# Patient Record
Sex: FEMALE | Race: WHITE | NOT HISPANIC OR LATINO | Employment: OTHER | URBAN - METROPOLITAN AREA
[De-identification: names, ages, dates, MRNs, and addresses within clinical notes are randomized per-mention and may not be internally consistent; named-entity substitution may affect disease eponyms.]

---

## 2017-11-15 LAB — RAPID GROUP A STREP (OHS): NEGATIVE

## 2018-01-29 LAB
INFLUENZA A ANTIGEN, POC: POSITIVE
INFLUENZA B ANTIGEN, POC: NEGATIVE
RAPID GROUP A STREP (OHS): NEGATIVE

## 2018-02-08 ENCOUNTER — HISTORICAL (OUTPATIENT)
Dept: CARDIOLOGY | Facility: HOSPITAL | Age: 58
End: 2018-02-08

## 2018-06-16 LAB — RAPID GROUP A STREP (OHS): NEGATIVE

## 2019-03-12 LAB
CALCIUM SERPL-MCNC: 8.9 MG/DL (ref 8.5–10.1)
CHLORIDE SERPL-SCNC: 104 MMOL/L (ref 98–107)
CHOLEST SERPL-MCNC: 189 MG/DL
CO2 SERPL-SCNC: 27 MMOL/L (ref 21–32)
GLUCOSE SERPL-MCNC: 84 MG/DL (ref 74–106)
HDLC SERPL-MCNC: 49 MG/DL (ref 35–60)
LDLC SERPL CALC-MCNC: 121 MG/DL
POTASSIUM SERPL-SCNC: 3.9 MMOL/L (ref 3.5–5.1)
SODIUM SERPL-SCNC: 139 MEQ/L (ref 131–145)
TRIGL SERPL-MCNC: 86 MG/DL (ref 30–150)

## 2019-03-14 ENCOUNTER — HISTORICAL (OUTPATIENT)
Dept: ADMINISTRATIVE | Facility: HOSPITAL | Age: 59
End: 2019-03-14

## 2019-03-14 LAB
ABS NEUT (OLG): 1.6 X10(3)/MCL (ref 2.1–9.2)
DEPRECATED CALCIDIOL+CALCIFEROL SERPL-MC: 22.78 NG/ML (ref 30–80)
EOSINOPHIL NFR BLD MANUAL: 32 % (ref 0–8)
ERYTHROCYTE [DISTWIDTH] IN BLOOD BY AUTOMATED COUNT: 13.8 % (ref 11.5–17)
HCT VFR BLD AUTO: 41.8 % (ref 37–47)
HGB BLD-MCNC: 13.6 GM/DL (ref 12–16)
LDH SERPL-CCNC: 243 UNIT/L (ref 84–246)
LYMPHOCYTES NFR BLD MANUAL: 33 % (ref 13–40)
MCH RBC QN AUTO: 31.4 PG (ref 27–31)
MCHC RBC AUTO-ENTMCNC: 32.5 GM/DL (ref 33–36)
MCV RBC AUTO: 96.5 FL (ref 80–94)
MONOCYTES NFR BLD MANUAL: 3 % (ref 2–11)
NEUTROPHILS NFR BLD MANUAL: 31 % (ref 47–80)
PLATELET # BLD AUTO: 241 X10(3)/MCL (ref 130–400)
PLATELET # BLD EST: NORMAL 10*3/UL
PMV BLD AUTO: 10.3 FL (ref 7.4–10.4)
RBC # BLD AUTO: 4.33 X10(6)/MCL (ref 4.2–5.4)
TSH SERPL-ACNC: 1.29 MIU/L (ref 0.36–3.74)
WBC # SPEC AUTO: 6.1 X10(3)/MCL (ref 4.5–11.5)

## 2019-03-18 ENCOUNTER — HISTORICAL (OUTPATIENT)
Dept: ADMINISTRATIVE | Facility: HOSPITAL | Age: 59
End: 2019-03-18

## 2019-04-01 ENCOUNTER — HISTORICAL (OUTPATIENT)
Dept: ADMINISTRATIVE | Facility: HOSPITAL | Age: 59
End: 2019-04-01

## 2019-04-01 LAB
ABS NEUT (OLG): 2.81 X10(3)/MCL (ref 2.1–9.2)
ALBUMIN SERPL-MCNC: 4.5 GM/DL (ref 3.4–5)
ALBUMIN/GLOB SERPL: 1.7 RATIO (ref 1.1–2)
ALP SERPL-CCNC: 67 UNIT/L (ref 38–126)
ALT SERPL-CCNC: 21 UNIT/L (ref 12–78)
ANISOCYTOSIS BLD QL SMEAR: 1
AST SERPL-CCNC: 12 UNIT/L (ref 15–37)
BASOPHILS # BLD AUTO: 0 X10(3)/MCL (ref 0–0)
BASOPHILS # BLD AUTO: 0 X10(3)/MCL (ref 0–0.2)
BASOPHILS NFR BLD AUTO: 0.4 %
BASOPHILS NFR BLD MANUAL: 2 % (ref 0–2)
BILIRUB SERPL-MCNC: 0.7 MG/DL (ref 0.2–1)
BILIRUBIN DIRECT+TOT PNL SERPL-MCNC: 0.2 MG/DL (ref 0–0.5)
BILIRUBIN DIRECT+TOT PNL SERPL-MCNC: 0.5 MG/DL (ref 0–0.8)
BUN SERPL-MCNC: 14 MG/DL (ref 7–18)
CALCIUM SERPL-MCNC: 9 MG/DL (ref 8.5–10.1)
CHLORIDE SERPL-SCNC: 106 MMOL/L (ref 98–107)
CO2 SERPL-SCNC: 29 MMOL/L (ref 21–32)
CREAT SERPL-MCNC: 0.57 MG/DL (ref 0.55–1.02)
CRP SERPL HS-MCNC: <0.16 MG/L (ref 0–3)
EOSINOPHIL # BLD AUTO: 2 X10(3)/MCL (ref 0–1)
EOSINOPHIL # BLD AUTO: 2.2 X10(3)/MCL (ref 0–0.9)
EOSINOPHIL NFR BLD AUTO: 27.9 %
EOSINOPHIL NFR BLD MANUAL: 30 % (ref 0–8)
ERYTHROCYTE [DISTWIDTH] IN BLOOD BY AUTOMATED COUNT: 13.2 % (ref 11.5–17)
ERYTHROCYTE [SEDIMENTATION RATE] IN BLOOD: 1 MM/HR (ref 0–20)
FERRITIN SERPL-MCNC: 33.8 NG/ML (ref 8–388)
FOLATE SERPL-MCNC: 25.1 NG/ML (ref 3.1–17.5)
GLOBULIN SER-MCNC: 2.7 GM/DL (ref 2.4–3.5)
GLUCOSE SERPL-MCNC: 112 MG/DL (ref 74–106)
HCT VFR BLD AUTO: 41.8 % (ref 37–47)
HGB BLD-MCNC: 13.3 GM/DL (ref 12–16)
IRON SATN MFR SERPL: 35.4 % (ref 20–50)
IRON SERPL-MCNC: 128 MCG/DL (ref 50–175)
LDH SERPL-CCNC: 218 UNIT/L (ref 84–246)
LYMPHOCYTES # BLD AUTO: 2 X10(3)/MCL (ref 1–5)
LYMPHOCYTES # BLD AUTO: 2.2 X10(3)/MCL (ref 0.6–4.6)
LYMPHOCYTES NFR BLD AUTO: 28.3 %
LYMPHOCYTES NFR BLD MANUAL: 30 % (ref 13–40)
MACROCYTES BLD QL SMEAR: 1 /MCL
MCH RBC QN AUTO: 30.7 PG (ref 27–31)
MCHC RBC AUTO-ENTMCNC: 31.8 GM/DL (ref 33–36)
MCV RBC AUTO: 96.5 FL (ref 80–94)
MONOCYTES # BLD AUTO: 0 X10(3)/MCL (ref 0–1)
MONOCYTES # BLD AUTO: 0.5 X10(3)/MCL (ref 0.1–1.3)
MONOCYTES NFR BLD AUTO: 6.8 %
MONOCYTES NFR BLD MANUAL: 4 % (ref 2–11)
NEUTROPHILS # BLD AUTO: 2.6 X10(3)/MCL (ref 2.1–9.2)
NEUTROPHILS # BLD AUTO: 2.8 X10(3)/MCL (ref 2.1–9.2)
NEUTROPHILS NFR BLD AUTO: 36.5 %
NEUTROPHILS NFR BLD MANUAL: 35 % (ref 47–80)
PLATELET # BLD AUTO: 257 X10(3)/MCL (ref 130–400)
PLATELET # BLD EST: NORMAL 10*3/UL
PMV BLD AUTO: 9.6 FL (ref 9.4–12.4)
POTASSIUM SERPL-SCNC: 4.1 MMOL/L (ref 3.5–5.1)
PROT SERPL-MCNC: 7.2 GM/DL (ref 6.4–8.2)
RBC # BLD AUTO: 4.33 X10(6)/MCL (ref 4.2–5.4)
SODIUM SERPL-SCNC: 139 MMOL/L (ref 136–145)
TIBC SERPL-MCNC: 362 MCG/DL (ref 250–450)
TRANSFERRIN SERPL-MCNC: 278 MG/DL (ref 200–360)
VIT B12 SERPL-MCNC: 1236 PG/ML (ref 193–986)
WBC # SPEC AUTO: 7.7 X10(3)/MCL (ref 4.5–11.5)

## 2019-04-04 ENCOUNTER — HISTORICAL (OUTPATIENT)
Dept: ADMINISTRATIVE | Facility: HOSPITAL | Age: 59
End: 2019-04-04

## 2019-04-16 ENCOUNTER — HISTORICAL (OUTPATIENT)
Dept: LAB | Facility: HOSPITAL | Age: 59
End: 2019-04-16

## 2019-04-23 ENCOUNTER — HISTORICAL (OUTPATIENT)
Dept: ADMINISTRATIVE | Facility: HOSPITAL | Age: 59
End: 2019-04-23

## 2019-04-23 LAB
ABS NEUT (OLG): 6.05 X10(3)/MCL (ref 2.1–9.2)
BASOPHILS # BLD AUTO: 0.1 X10(3)/MCL (ref 0–0.2)
BASOPHILS NFR BLD AUTO: 0.8 %
EOSINOPHIL # BLD AUTO: 0.3 X10(3)/MCL (ref 0–0.9)
EOSINOPHIL NFR BLD AUTO: 2.5 %
ERYTHROCYTE [DISTWIDTH] IN BLOOD BY AUTOMATED COUNT: 13 % (ref 11.5–17)
HCT VFR BLD AUTO: 43.5 % (ref 37–47)
HGB BLD-MCNC: 13.8 GM/DL (ref 12–16)
LYMPHOCYTES # BLD AUTO: 4.2 X10(3)/MCL (ref 0.6–4.6)
LYMPHOCYTES NFR BLD AUTO: 35.3 %
MCH RBC QN AUTO: 30.7 PG (ref 27–31)
MCHC RBC AUTO-ENTMCNC: 31.7 GM/DL (ref 33–36)
MCV RBC AUTO: 96.7 FL (ref 80–94)
MONOCYTES # BLD AUTO: 1.2 X10(3)/MCL (ref 0.1–1.3)
MONOCYTES NFR BLD AUTO: 9.9 %
NEUTROPHILS # BLD AUTO: 6 X10(3)/MCL (ref 2.1–9.2)
NEUTROPHILS NFR BLD AUTO: 51.3 %
PLATELET # BLD AUTO: 295 X10(3)/MCL (ref 130–400)
PMV BLD AUTO: 9.4 FL (ref 9.4–12.4)
RBC # BLD AUTO: 4.5 X10(6)/MCL (ref 4.2–5.4)
WBC # SPEC AUTO: 11.8 X10(3)/MCL (ref 4.5–11.5)

## 2019-05-10 ENCOUNTER — TELEPHONE (OUTPATIENT)
Dept: RHEUMATOLOGY | Facility: CLINIC | Age: 59
End: 2019-05-10

## 2019-05-10 NOTE — TELEPHONE ENCOUNTER
Patient returned phone call left by an employee stating for her to return the phone call to bring certain documents to her appointment. I informed her that the nurses did not call her and that it may be a missed call from registration. Patient verbalized her appointment and stated that she will be coming to her appointment on 05/14/2019 with Dr. Mabry.

## 2019-05-10 NOTE — TELEPHONE ENCOUNTER
----- Message from Lori Dennise sent at 5/10/2019  9:42 AM CDT -----  Contact: pt  .Type:  Patient Returning Call    Who Called: PT  Who Left Message for Patient: NURSE  Does the patient know what this is regarding?: RETURNING CALL   Would the patient rather a call back or a response via [facility]? CALL BACK  Best Call Back Number: [phone] (home)     Additional Information:  PT STATED THIS IS THE THIRD TIME SENDING A MESSAGE

## 2019-05-14 ENCOUNTER — OFFICE VISIT (OUTPATIENT)
Dept: RHEUMATOLOGY | Facility: CLINIC | Age: 59
End: 2019-05-14
Payer: COMMERCIAL

## 2019-05-14 ENCOUNTER — LAB VISIT (OUTPATIENT)
Dept: LAB | Facility: HOSPITAL | Age: 59
End: 2019-05-14
Attending: INTERNAL MEDICINE
Payer: COMMERCIAL

## 2019-05-14 VITALS
WEIGHT: 116.63 LBS | SYSTOLIC BLOOD PRESSURE: 139 MMHG | HEIGHT: 62 IN | BODY MASS INDEX: 21.46 KG/M2 | HEART RATE: 79 BPM | DIASTOLIC BLOOD PRESSURE: 83 MMHG

## 2019-05-14 DIAGNOSIS — D84.9 IMMUNOSUPPRESSED STATUS: ICD-10-CM

## 2019-05-14 DIAGNOSIS — Z79.899 LONG TERM CURRENT USE OF IMMUNOSUPPRESSIVE DRUG: ICD-10-CM

## 2019-05-14 DIAGNOSIS — M05.79 RHEUMATOID ARTHRITIS INVOLVING MULTIPLE SITES WITH POSITIVE RHEUMATOID FACTOR: Primary | ICD-10-CM

## 2019-05-14 DIAGNOSIS — M05.79 RHEUMATOID ARTHRITIS INVOLVING MULTIPLE SITES WITH POSITIVE RHEUMATOID FACTOR: ICD-10-CM

## 2019-05-14 DIAGNOSIS — D72.10 EOSINOPHILIA: ICD-10-CM

## 2019-05-14 PROBLEM — Z79.60 LONG TERM CURRENT USE OF IMMUNOSUPPRESSIVE DRUG: Status: ACTIVE | Noted: 2019-05-14

## 2019-05-14 LAB
ALBUMIN SERPL BCP-MCNC: 4.1 G/DL (ref 3.5–5.2)
ALP SERPL-CCNC: 87 U/L (ref 55–135)
ALT SERPL W/O P-5'-P-CCNC: 12 U/L (ref 10–44)
ANION GAP SERPL CALC-SCNC: 8 MMOL/L (ref 8–16)
AST SERPL-CCNC: 13 U/L (ref 10–40)
BASOPHILS # BLD AUTO: 0.04 K/UL (ref 0–0.2)
BASOPHILS NFR BLD: 0.5 % (ref 0–1.9)
BILIRUB SERPL-MCNC: 0.3 MG/DL (ref 0.1–1)
BUN SERPL-MCNC: 13 MG/DL (ref 6–20)
CALCIUM SERPL-MCNC: 9.8 MG/DL (ref 8.7–10.5)
CHLORIDE SERPL-SCNC: 108 MMOL/L (ref 95–110)
CO2 SERPL-SCNC: 25 MMOL/L (ref 23–29)
CREAT SERPL-MCNC: 0.6 MG/DL (ref 0.5–1.4)
CRP SERPL-MCNC: 1.4 MG/L (ref 0–8.2)
DIFFERENTIAL METHOD: ABNORMAL
EOSINOPHIL # BLD AUTO: 0.8 K/UL (ref 0–0.5)
EOSINOPHIL NFR BLD: 10.8 % (ref 0–8)
ERYTHROCYTE [DISTWIDTH] IN BLOOD BY AUTOMATED COUNT: 12.5 % (ref 11.5–14.5)
ERYTHROCYTE [SEDIMENTATION RATE] IN BLOOD BY WESTERGREN METHOD: 11 MM/HR (ref 0–36)
EST. GFR  (AFRICAN AMERICAN): >60 ML/MIN/1.73 M^2
EST. GFR  (NON AFRICAN AMERICAN): >60 ML/MIN/1.73 M^2
GLUCOSE SERPL-MCNC: 94 MG/DL (ref 70–110)
HCT VFR BLD AUTO: 39.7 % (ref 37–48.5)
HGB BLD-MCNC: 12.9 G/DL (ref 12–16)
IMM GRANULOCYTES # BLD AUTO: 0.01 K/UL (ref 0–0.04)
IMM GRANULOCYTES NFR BLD AUTO: 0.1 % (ref 0–0.5)
LYMPHOCYTES # BLD AUTO: 2.6 K/UL (ref 1–4.8)
LYMPHOCYTES NFR BLD: 34.4 % (ref 18–48)
MCH RBC QN AUTO: 30.8 PG (ref 27–31)
MCHC RBC AUTO-ENTMCNC: 32.5 G/DL (ref 32–36)
MCV RBC AUTO: 95 FL (ref 82–98)
MONOCYTES # BLD AUTO: 0.6 K/UL (ref 0.3–1)
MONOCYTES NFR BLD: 7.7 % (ref 4–15)
NEUTROPHILS # BLD AUTO: 3.6 K/UL (ref 1.8–7.7)
NEUTROPHILS NFR BLD: 46.6 % (ref 38–73)
NRBC BLD-RTO: 0 /100 WBC
PLATELET # BLD AUTO: 348 K/UL (ref 150–350)
PMV BLD AUTO: 9.5 FL (ref 9.2–12.9)
POTASSIUM SERPL-SCNC: 3.9 MMOL/L (ref 3.5–5.1)
PROT SERPL-MCNC: 7.1 G/DL (ref 6–8.4)
RBC # BLD AUTO: 4.19 M/UL (ref 4–5.4)
SODIUM SERPL-SCNC: 141 MMOL/L (ref 136–145)
WBC # BLD AUTO: 7.65 K/UL (ref 3.9–12.7)

## 2019-05-14 PROCEDURE — 36415 COLL VENOUS BLD VENIPUNCTURE: CPT

## 2019-05-14 PROCEDURE — 80053 COMPREHEN METABOLIC PANEL: CPT

## 2019-05-14 PROCEDURE — 86803 HEPATITIS C AB TEST: CPT

## 2019-05-14 PROCEDURE — 99205 OFFICE O/P NEW HI 60 MIN: CPT | Mod: S$GLB,,, | Performed by: INTERNAL MEDICINE

## 2019-05-14 PROCEDURE — 99999 PR PBB SHADOW E&M-EST. PATIENT-LVL III: CPT | Mod: PBBFAC,,, | Performed by: INTERNAL MEDICINE

## 2019-05-14 PROCEDURE — 99999 PR PBB SHADOW E&M-EST. PATIENT-LVL III: ICD-10-PCS | Mod: PBBFAC,,, | Performed by: INTERNAL MEDICINE

## 2019-05-14 PROCEDURE — 86038 ANTINUCLEAR ANTIBODIES: CPT

## 2019-05-14 PROCEDURE — 86140 C-REACTIVE PROTEIN: CPT

## 2019-05-14 PROCEDURE — 85025 COMPLETE CBC W/AUTO DIFF WBC: CPT

## 2019-05-14 PROCEDURE — 99205 PR OFFICE/OUTPT VISIT, NEW, LEVL V, 60-74 MIN: ICD-10-PCS | Mod: S$GLB,,, | Performed by: INTERNAL MEDICINE

## 2019-05-14 PROCEDURE — 86200 CCP ANTIBODY: CPT

## 2019-05-14 PROCEDURE — 86431 RHEUMATOID FACTOR QUANT: CPT

## 2019-05-14 PROCEDURE — 85652 RBC SED RATE AUTOMATED: CPT

## 2019-05-14 PROCEDURE — 3008F PR BODY MASS INDEX (BMI) DOCUMENTED: ICD-10-PCS | Mod: CPTII,S$GLB,, | Performed by: INTERNAL MEDICINE

## 2019-05-14 PROCEDURE — 3008F BODY MASS INDEX DOCD: CPT | Mod: CPTII,S$GLB,, | Performed by: INTERNAL MEDICINE

## 2019-05-14 PROCEDURE — 87340 HEPATITIS B SURFACE AG IA: CPT

## 2019-05-14 PROCEDURE — 86704 HEP B CORE ANTIBODY TOTAL: CPT

## 2019-05-14 RX ORDER — CEPHALEXIN 500 MG/1
500 CAPSULE ORAL EVERY 12 HOURS
Qty: 60 CAPSULE | Refills: 0 | Status: SHIPPED | OUTPATIENT
Start: 2019-05-14 | End: 2020-07-16 | Stop reason: ALTCHOICE

## 2019-05-14 RX ORDER — PANTOPRAZOLE SODIUM 40 MG/1
40 TABLET, DELAYED RELEASE ORAL
COMMUNITY
Start: 2016-01-15 | End: 2019-07-31

## 2019-05-14 RX ORDER — METHOTREXATE 2.5 MG/1
TABLET ORAL
Refills: 3 | COMMUNITY
Start: 2019-03-21 | End: 2019-05-14 | Stop reason: SDUPTHER

## 2019-05-14 RX ORDER — METHOTREXATE 2.5 MG/1
7.5 TABLET ORAL
Qty: 36 TABLET | Refills: 1 | Status: SHIPPED | OUTPATIENT
Start: 2019-05-14 | End: 2020-01-30 | Stop reason: SDUPTHER

## 2019-05-14 RX ORDER — DIGOXIN 250 MCG
0.25 TABLET ORAL DAILY
Refills: 6 | COMMUNITY
Start: 2019-04-30 | End: 2021-02-01

## 2019-05-14 RX ORDER — VERAPAMIL HYDROCHLORIDE 120 MG/1
120 CAPSULE, EXTENDED RELEASE ORAL DAILY
Refills: 6 | COMMUNITY
Start: 2019-04-19

## 2019-05-14 RX ORDER — FOLIC ACID 1 MG/1
1 TABLET ORAL DAILY
Qty: 90 TABLET | Refills: 1 | Status: SHIPPED | OUTPATIENT
Start: 2019-05-14 | End: 2020-04-21 | Stop reason: SDUPTHER

## 2019-05-14 RX ORDER — ASPIRIN 81 MG/1
81 TABLET ORAL
COMMUNITY

## 2019-05-14 RX ORDER — FOLIC ACID 1 MG/1
TABLET ORAL
COMMUNITY
Start: 2019-05-13 | End: 2019-05-14 | Stop reason: SDUPTHER

## 2019-05-14 NOTE — ASSESSMENT & PLAN NOTE
Chronic stable improving eosinophilia under care of hematologist.  She also has mild nonspecific lymphadenopathies.  Advised in detail about high risk of lymphoma in the background of rheumatoid arthritis and biologic medications.  Advised to closely follow up with oncologist and update me with any changes in the treatment course or diagnostics.  No suspicion for malignancy at this time per oncologist

## 2019-05-14 NOTE — ASSESSMENT & PLAN NOTE
Severely progressive seropositive nonerosive rheumatoid arthritis improving on Orencia after failing methotrexate, Enbrel and having adverse effects from Actemra.  Continue Orencia.  New prescription sent to specialty pharmacy.  Check TB QuantiFERON and hepatitis labs to get approval for Orencia.  Discussed in detail about all adverse effects of the medication including high risk of infection and malignancy.

## 2019-05-14 NOTE — ASSESSMENT & PLAN NOTE
Compromised immune system secondary to autoimmune disease and use of immunosuppressive drugs. Monitor carefully for infections. Advised to get immediate medical care if any infection. Also advised strict adherence to age appropriate vaccinations and cancer screenings with PCP.

## 2019-05-14 NOTE — PROGRESS NOTES
RHEUMATOLOGY CLINIC INITIAL VISIT    Chief complaints:-  To establish care for rheumatoid arthritis.    HPI:-  Katarzyna Lewis a 58 y.o. pleasant female comes in for an initial visit with above chief complaints.  She has longstanding seropositive severely progressive rheumatoid arthritis.  She was initially treated with Enbrel and then switched to Actemra.  She was on Actemra for more than 10 years.  Recently she was switched to Orencia when she was noted to have high eosinophil count.  She was also started on methotrexate.  Since starting methotrexate she has noticed significant improvement in her rheumatoid arthritis.  She denies any flare-up since switching to Orencia.  She takes subcutaneous injection once every week.  She denies recurrent history of fever or chills.  She has recurrent UTIs for which her prior rheumatologist used to give her 30 day prescription of cephalexin which usually last for a year.  Without that antibiotic the infection gets prolonged.  She also has a recent history of lymphadenopathies under her axilla for which she establish care with oncologist.  As per oncologist suspicion for any underlying lymphoma is very low.  Her  was recently diagnosed with lymphoma and completed investigational chemotherapy at [facility].    Review of Systems   Constitutional: Negative for chills and fever.   HENT: Negative for congestion and sore throat.    Eyes: Negative for blurred vision and redness.   Respiratory: Negative for cough and shortness of breath.    Cardiovascular: Negative for chest pain and leg swelling.   Gastrointestinal: Negative for abdominal pain.   Genitourinary: Negative for dysuria.   Musculoskeletal: Positive for back pain and joint pain. Negative for falls, myalgias and neck pain.   Skin: Negative for rash.   Neurological: Negative for headaches.   Endo/Heme/Allergies: Does not bruise/bleed easily.   Psychiatric/Behavioral: Negative for memory loss. The  "patient does not have insomnia.        Past Medical History:   Diagnosis Date    Acid reflux     Anxiety     Arthritis     Atrial fibrillation     Clotting disorder     Emphysema of lung     Thyroid disease        Past Surgical History:   Procedure Laterality Date     SECTION          Social History     Tobacco Use    Smoking status: Never Smoker    Smokeless tobacco: Never Used   Substance Use Topics    Alcohol use: Not on file    Drug use: Not on file       Family History   Problem Relation Age of Onset    Thyroid disease Mother     Alzheimer's disease Father        Review of patient's allergies indicates:  No Known Allergies    Vitals:    19 1347   BP: 139/83   Pulse: 79   Weight: 52.9 kg (116 lb 10 oz)   Height: 5' 2" (1.575 m)   PainSc: 0-No pain       Physical Exam   Constitutional: She is oriented to person, place, and time and well-developed, well-nourished, and in no distress. No distress.   HENT:   Head: Normocephalic.   Mouth/Throat: Oropharynx is clear and moist.   Eyes: Pupils are equal, round, and reactive to light. Conjunctivae and EOM are normal.   Neck: Normal range of motion. Neck supple.   Cardiovascular: Normal rate and intact distal pulses.   Pulmonary/Chest: Effort normal. No respiratory distress.   Abdominal: Soft. There is no tenderness.   Musculoskeletal:   No synovitis over small joints of hands or feet.  Mild chronic synovial thickening.  Nontender rheumatoid nodule under right elbow and left 3rd finger.  No effusion over large joints.   Neurological: She is alert and oriented to person, place, and time. No cranial nerve deficit.   Skin: Skin is warm. No rash noted. No erythema.   Psychiatric: Mood and affect normal.   Nursing note and vitals reviewed.      Old and Outside medical records :-  Reviewed old and all outside medical records available in Care Everywhere.  Longstanding history of seropositive rheumatoid arthritis on methotrexate, Actemra and now on " Orencia.  Chronic eosinophilia.    Medication List with Changes/Refills   New Medications    ABATACEPT (ORENCIA CLICKJECT) 125 MG/ML ATIN    Inject 125 mg into the skin every 7 days.    CEPHALEXIN (KEFLEX) 500 MG CAPSULE    Take 1 capsule (500 mg total) by mouth every 12 (twelve) hours.   Current Medications    ASPIRIN (ECOTRIN) 81 MG EC TABLET    Take 81 mg by mouth.    DIGOXIN (LANOXIN) 250 MCG TABLET    Take 0.25 mg by mouth once daily.    PANTOPRAZOLE (PROTONIX) 40 MG TABLET    Take 40 mg by mouth.    VERAPAMIL (VERELAN) 120 MG C24P    Take 120 mg by mouth once daily.   Changed and/or Refilled Medications    Modified Medication Previous Medication    FOLIC ACID (FOLVITE) 1 MG TABLET folic acid (FOLVITE) 1 MG tablet       Take 1 tablet (1 mg total) by mouth once daily.        METHOTREXATE 2.5 MG TAB methotrexate 2.5 MG Tab       Take 3 tablets (7.5 mg total) by mouth every 7 days.    TAKE 5 TABLETS BY MOUTH ONCE A WEEK.       Assessment/Plans:-  1. Rheumatoid arthritis involving multiple sites with positive rheumatoid factor    2. Long term current use of immunosuppressive drug    3. Immunosuppressed status    4. Eosinophilia      Problem List Items Addressed This Visit        Immunology/Multi System    Immunosuppressed status    Current Assessment & Plan     Compromised immune system secondary to autoimmune disease and use of immunosuppressive drugs. Monitor carefully for infections. Advised to get immediate medical care if any infection. Also advised strict adherence to age appropriate vaccinations and cancer screenings with PCP.            Relevant Medications    abatacept (ORENCIA CLICKJECT) 125 mg/mL AtIn    Other Relevant Orders    CBC auto differential    Comprehensive metabolic panel    CBC auto differential    Comprehensive metabolic panel    C-reactive protein    Sedimentation rate       Oncology    Eosinophilia    Current Assessment & Plan     Chronic stable improving eosinophilia under care of  hematologist.  She also has mild nonspecific lymphadenopathies.  Advised in detail about high risk of lymphoma in the background of rheumatoid arthritis and biologic medications.  Advised to closely follow up with oncologist and update me with any changes in the treatment course or diagnostics.  No suspicion for malignancy at this time per oncologist            Orthopedic    Rheumatoid arthritis involving multiple sites with positive rheumatoid factor - Primary    Current Assessment & Plan     Severely progressive seropositive nonerosive rheumatoid arthritis improving on Orencia after failing methotrexate, Enbrel and having adverse effects from Actemra.  Continue Orencia.  New prescription sent to specialty pharmacy.  Check TB QuantiFERON and hepatitis labs to get approval for Orencia.  Discussed in detail about all adverse effects of the medication including high risk of infection and malignancy.         Relevant Medications    folic acid (FOLVITE) 1 MG tablet    methotrexate 2.5 MG Tab    abatacept (ORENCIA CLICKJECT) 125 mg/mL AtIn    Other Relevant Orders    JOSELIN Screen w/Reflex    C-reactive protein    Sedimentation rate    Rheumatoid factor    Cyclic citrul peptide antibody, IgG    CBC auto differential    Comprehensive metabolic panel    Quantiferon Gold TB    Hepatitis B surface antigen    Hepatitis C antibody    Hepatitis B core antibody, total    CBC auto differential    Comprehensive metabolic panel    C-reactive protein    Sedimentation rate       Other    Long term current use of immunosuppressive drug    Current Assessment & Plan     Hold Orencia if any infection.  Check safety labs every visit.         Relevant Medications    abatacept (ORENCIA CLICKJECT) 125 mg/mL AtIn    Other Relevant Orders    CBC auto differential    Comprehensive metabolic panel    CBC auto differential    Comprehensive metabolic panel    C-reactive protein    Sedimentation rate          # Follow up in about 3 months (around  8/14/2019).    Thank you for allowing me to participate in the care Yousuf Shi.    Disclaimer: This note was prepared using voice recognition system and is likely to have sound alike errors and is not proof read.  Please call me with any questions.

## 2019-05-15 LAB
ANA SER QL IF: NORMAL
CCP AB SER IA-ACNC: 11.7 U/ML
HBV CORE AB SERPL QL IA: NEGATIVE
HBV SURFACE AG SERPL QL IA: NEGATIVE
HCV AB SERPL QL IA: NEGATIVE
RHEUMATOID FACT SERPL-ACNC: 74 IU/ML (ref 0–15)

## 2019-05-16 ENCOUNTER — TELEPHONE (OUTPATIENT)
Dept: RHEUMATOLOGY | Facility: CLINIC | Age: 59
End: 2019-05-16

## 2019-05-16 ENCOUNTER — HISTORICAL (OUTPATIENT)
Dept: ADMINISTRATIVE | Facility: HOSPITAL | Age: 59
End: 2019-05-16

## 2019-05-16 ENCOUNTER — TELEPHONE (OUTPATIENT)
Dept: PHARMACY | Facility: CLINIC | Age: 59
End: 2019-05-16

## 2019-05-16 LAB
ABS NEUT (OLG): 3.34 X10(3)/MCL (ref 2.1–9.2)
BASOPHILS # BLD AUTO: 0 X10(3)/MCL (ref 0–0.2)
BASOPHILS NFR BLD AUTO: 0.6 %
EOSINOPHIL # BLD AUTO: 0.5 X10(3)/MCL (ref 0–0.9)
EOSINOPHIL NFR BLD AUTO: 7.1 %
ERYTHROCYTE [DISTWIDTH] IN BLOOD BY AUTOMATED COUNT: 12 % (ref 11.5–17)
HCT VFR BLD AUTO: 42.5 % (ref 37–47)
HGB BLD-MCNC: 13.5 GM/DL (ref 12–16)
LYMPHOCYTES # BLD AUTO: 2.5 X10(3)/MCL (ref 0.6–4.6)
LYMPHOCYTES NFR BLD AUTO: 35.6 %
MCH RBC QN AUTO: 30.7 PG (ref 27–31)
MCHC RBC AUTO-ENTMCNC: 31.8 GM/DL (ref 33–36)
MCV RBC AUTO: 96.6 FL (ref 80–94)
MONOCYTES # BLD AUTO: 0.6 X10(3)/MCL (ref 0.1–1.3)
MONOCYTES NFR BLD AUTO: 8.9 %
NEUTROPHILS # BLD AUTO: 3.3 X10(3)/MCL (ref 2.1–9.2)
NEUTROPHILS NFR BLD AUTO: 47.7 %
PLATELET # BLD AUTO: 283 X10(3)/MCL (ref 130–400)
PMV BLD AUTO: 10.2 FL (ref 9.4–12.4)
RBC # BLD AUTO: 4.4 X10(6)/MCL (ref 4.2–5.4)
WBC # SPEC AUTO: 7 X10(3)/MCL (ref 4.5–11.5)

## 2019-05-16 NOTE — TELEPHONE ENCOUNTER
Notified the patient we received the prescription for Orencia, and it will require authorization from the insurance company. Patient voiced understanding.    TANVI

## 2019-05-21 ENCOUNTER — TELEPHONE (OUTPATIENT)
Dept: PHARMACY | Facility: CLINIC | Age: 59
End: 2019-05-21

## 2019-05-21 NOTE — TELEPHONE ENCOUNTER
FOR DOCUMENTATION ONLY:  Financial Assistance for Orencia approved  Source: Copay Card  BIN: [id number]  PCN: [employer]  ID: [id number]  Group: [id number]    Must use [facility]  Ph: [phone]  Fx: [phone]

## 2019-05-22 ENCOUNTER — HISTORICAL (OUTPATIENT)
Dept: RADIOLOGY | Facility: HOSPITAL | Age: 59
End: 2019-05-22

## 2019-05-28 ENCOUNTER — HISTORICAL (OUTPATIENT)
Dept: RADIOLOGY | Facility: HOSPITAL | Age: 59
End: 2019-05-28

## 2019-06-04 NOTE — TELEPHONE ENCOUNTER
DOCUMENTATION ONLY:  Prior Authorization for Orencia submitted to patient's NEW insurance company.

## 2019-06-24 NOTE — TELEPHONE ENCOUNTER
Orencia is approved by the patient's insurance with a high co pay. We will be assisting the patient in applying to the  for assistance.   Sending Dr Baron Mabry,  a staff message regarding approval and faxing assistance application for their review and signature

## 2019-06-27 DIAGNOSIS — Z79.899 LONG TERM CURRENT USE OF IMMUNOSUPPRESSIVE DRUG: ICD-10-CM

## 2019-06-27 DIAGNOSIS — D84.9 IMMUNOSUPPRESSED STATUS: ICD-10-CM

## 2019-06-27 DIAGNOSIS — M05.79 RHEUMATOID ARTHRITIS INVOLVING MULTIPLE SITES WITH POSITIVE RHEUMATOID FACTOR: ICD-10-CM

## 2019-07-01 ENCOUNTER — HISTORICAL (OUTPATIENT)
Dept: RADIOLOGY | Facility: HOSPITAL | Age: 59
End: 2019-07-01

## 2019-07-01 LAB — POC CREATININE: 0.5 MG/DL (ref 0.6–1.3)

## 2019-07-25 DIAGNOSIS — Z79.899 LONG TERM CURRENT USE OF IMMUNOSUPPRESSIVE DRUG: ICD-10-CM

## 2019-07-25 DIAGNOSIS — M05.79 RHEUMATOID ARTHRITIS INVOLVING MULTIPLE SITES WITH POSITIVE RHEUMATOID FACTOR: ICD-10-CM

## 2019-07-25 DIAGNOSIS — D84.9 IMMUNOSUPPRESSED STATUS: ICD-10-CM

## 2019-07-25 NOTE — TELEPHONE ENCOUNTER
----- Message from Nithya Tobin sent at 7/25/2019  3:33 PM CDT -----  Contact: ms jones-[facility] medication refill needed...[phone]

## 2019-07-29 LAB
ALBUMIN SERPL-MCNC: 4.3 G/DL (ref 3.6–5.1)
ALBUMIN/GLOB SERPL: 1.8 (CALC) (ref 1–2.5)
ALP SERPL-CCNC: 101 U/L (ref 33–130)
ALT SERPL-CCNC: 10 U/L (ref 6–29)
AST SERPL-CCNC: 12 U/L (ref 10–35)
BASOPHILS # BLD AUTO: 61 CELLS/UL (ref 0–200)
BASOPHILS NFR BLD AUTO: 0.7 %
BILIRUB SERPL-MCNC: 0.6 MG/DL (ref 0.2–1.2)
BUN SERPL-MCNC: 9 MG/DL (ref 7–25)
BUN/CREAT SERPL: NORMAL (CALC) (ref 6–22)
CALCIUM SERPL-MCNC: 9.2 MG/DL (ref 8.6–10.4)
CHLORIDE SERPL-SCNC: 104 MMOL/L (ref 98–110)
CO2 SERPL-SCNC: 26 MMOL/L (ref 20–32)
CREAT SERPL-MCNC: 0.55 MG/DL (ref 0.5–1.05)
CRP SERPL-MCNC: 2.8 MG/L
EOSINOPHIL # BLD AUTO: 209 CELLS/UL (ref 15–500)
EOSINOPHIL NFR BLD AUTO: 2.4 %
ERYTHROCYTE [DISTWIDTH] IN BLOOD BY AUTOMATED COUNT: 12.6 % (ref 11–15)
ERYTHROCYTE [SEDIMENTATION RATE] IN BLOOD BY WESTERGREN METHOD: 2 MM/H
GFRSERPLBLD MDRD-ARVRAT: 104 ML/MIN/1.73M2
GLOBULIN SER CALC-MCNC: 2.4 G/DL (CALC) (ref 1.9–3.7)
GLUCOSE SERPL-MCNC: 90 MG/DL (ref 65–139)
HCT VFR BLD AUTO: 40.1 % (ref 35–45)
HGB BLD-MCNC: 13.2 G/DL (ref 11.7–15.5)
LYMPHOCYTES # BLD AUTO: 2279 CELLS/UL (ref 850–3900)
LYMPHOCYTES NFR BLD AUTO: 26.2 %
MCH RBC QN AUTO: 29.8 PG (ref 27–33)
MCHC RBC AUTO-ENTMCNC: 32.9 G/DL (ref 32–36)
MCV RBC AUTO: 90.5 FL (ref 80–100)
MONOCYTES # BLD AUTO: 600 CELLS/UL (ref 200–950)
MONOCYTES NFR BLD AUTO: 6.9 %
NEUTROPHILS # BLD AUTO: 5551 CELLS/UL (ref 1500–7800)
NEUTROPHILS NFR BLD AUTO: 63.8 %
PLATELET # BLD AUTO: 286 THOUSAND/UL (ref 140–400)
PMV BLD REES-ECKER: 10.4 FL (ref 7.5–12.5)
POTASSIUM SERPL-SCNC: 4.1 MMOL/L (ref 3.5–5.3)
PROT SERPL-MCNC: 6.7 G/DL (ref 6.1–8.1)
RBC # BLD AUTO: 4.43 MILLION/UL (ref 3.8–5.1)
SODIUM SERPL-SCNC: 140 MMOL/L (ref 135–146)
WBC # BLD AUTO: 8.7 THOUSAND/UL (ref 3.8–10.8)

## 2019-07-30 ENCOUNTER — TELEPHONE (OUTPATIENT)
Dept: RHEUMATOLOGY | Facility: CLINIC | Age: 59
End: 2019-07-30

## 2019-07-31 ENCOUNTER — OFFICE VISIT (OUTPATIENT)
Dept: RHEUMATOLOGY | Facility: CLINIC | Age: 59
End: 2019-07-31
Payer: MEDICARE

## 2019-07-31 VITALS
DIASTOLIC BLOOD PRESSURE: 80 MMHG | WEIGHT: 117.75 LBS | SYSTOLIC BLOOD PRESSURE: 141 MMHG | HEIGHT: 62 IN | HEART RATE: 83 BPM | BODY MASS INDEX: 21.67 KG/M2

## 2019-07-31 DIAGNOSIS — D84.9 IMMUNOSUPPRESSED STATUS: ICD-10-CM

## 2019-07-31 DIAGNOSIS — Z79.899 LONG TERM CURRENT USE OF IMMUNOSUPPRESSIVE DRUG: ICD-10-CM

## 2019-07-31 DIAGNOSIS — D72.10 EOSINOPHILIA: ICD-10-CM

## 2019-07-31 DIAGNOSIS — M05.79 RHEUMATOID ARTHRITIS INVOLVING MULTIPLE SITES WITH POSITIVE RHEUMATOID FACTOR: Primary | ICD-10-CM

## 2019-07-31 PROCEDURE — 99214 PR OFFICE/OUTPT VISIT, EST, LEVL IV, 30-39 MIN: ICD-10-PCS | Mod: S$PBB,,, | Performed by: INTERNAL MEDICINE

## 2019-07-31 PROCEDURE — 99214 OFFICE O/P EST MOD 30 MIN: CPT | Mod: S$PBB,,, | Performed by: INTERNAL MEDICINE

## 2019-07-31 PROCEDURE — 99999 PR PBB SHADOW E&M-EST. PATIENT-LVL III: CPT | Mod: PBBFAC,,, | Performed by: INTERNAL MEDICINE

## 2019-07-31 PROCEDURE — 99999 PR PBB SHADOW E&M-EST. PATIENT-LVL III: ICD-10-PCS | Mod: PBBFAC,,, | Performed by: INTERNAL MEDICINE

## 2019-07-31 PROCEDURE — 99213 OFFICE O/P EST LOW 20 MIN: CPT | Mod: PBBFAC | Performed by: INTERNAL MEDICINE

## 2019-07-31 NOTE — ASSESSMENT & PLAN NOTE
Normal count per recent labs.  Continue follow-up with hematologist for nonspecific lymphadenopathies.  PET-CT normal per patient.

## 2019-07-31 NOTE — PROGRESS NOTES
RHEUMATOLOGY CLINIC FOLLOW UP VISIT  Chief complaints:-  To follow up for rheumatoid arthritis.    HPI:-  Kaatrzyna Lewis a 59 y.o. pleasant female comes in for a follow up visit.  She reports doing well today.  No morning stiffness.  No joint pain.  Orencia as working really well for her.  No adverse effects.  No fever since last visit.  No infections since last visit.  Had multiple workup including PET-CT done by oncologist for lymphadenopathy which came back negative for malignancy per patient.    Review of Systems   Constitutional: Negative for chills and fever.   HENT: Negative for congestion and sore throat.    Eyes: Negative for blurred vision and redness.   Respiratory: Negative for cough and shortness of breath.    Cardiovascular: Negative for chest pain and leg swelling.   Gastrointestinal: Negative for abdominal pain.   Genitourinary: Negative for dysuria.   Musculoskeletal: Negative for back pain, falls, joint pain, myalgias and neck pain.   Skin: Negative for rash.   Neurological: Negative for headaches.   Endo/Heme/Allergies: Does not bruise/bleed easily.   Psychiatric/Behavioral: Negative for memory loss. The patient does not have insomnia.        Past Medical History:   Diagnosis Date    Acid reflux     Anxiety     Arthritis     Atrial fibrillation     Clotting disorder     Emphysema of lung     Thyroid disease        Past Surgical History:   Procedure Laterality Date     SECTION          Social History     Tobacco Use    Smoking status: Never Smoker    Smokeless tobacco: Never Used   Substance Use Topics    Alcohol use: Not on file    Drug use: Not on file       Family History   Problem Relation Age of Onset    Thyroid disease Mother     Alzheimer's disease Father        Review of patient's allergies indicates:  No Known Allergies    Vitals:    19 1350   BP: (!) 141/80   Pulse: 83   Weight: 53.4 kg (117 lb 11.6 oz)  "  Height: 5' 2" (1.575 m)   PainSc: 0-No pain       Physical Exam   Constitutional: She is oriented to person, place, and time and well-developed, well-nourished, and in no distress. No distress.   HENT:   Head: Normocephalic.   Mouth/Throat: Oropharynx is clear and moist.   Eyes: Pupils are equal, round, and reactive to light. Conjunctivae and EOM are normal.   Neck: Normal range of motion. Neck supple.   Cardiovascular: Normal rate and intact distal pulses.   Pulmonary/Chest: Effort normal. No respiratory distress.   Abdominal: Soft. There is no tenderness.   Musculoskeletal:   Mild synovial thickening of right 2nd MCP.  No synovitis over small joints of hands or feet.  No effusion over large joints.   Neurological: She is alert and oriented to person, place, and time. No cranial nerve deficit.   Skin: Skin is warm. No rash noted. No erythema.   Psychiatric: Mood and affect normal.   Nursing note and vitals reviewed.      Medication List with Changes/Refills   Current Medications    ABATACEPT (ORENCIA CLICKJECT) 125 MG/ML ATIN    Inject 125 mg into the skin every 7 days.    ASPIRIN (ECOTRIN) 81 MG EC TABLET    Take 81 mg by mouth.    CEPHALEXIN (KEFLEX) 500 MG CAPSULE    Take 1 capsule (500 mg total) by mouth every 12 (twelve) hours.    DIGOXIN (LANOXIN) 250 MCG TABLET    Take 0.25 mg by mouth once daily.    FOLIC ACID (FOLVITE) 1 MG TABLET    Take 1 tablet (1 mg total) by mouth once daily.    METHOTREXATE 2.5 MG TAB    Take 3 tablets (7.5 mg total) by mouth every 7 days.    VERAPAMIL (VERELAN) 120 MG C24P    Take 120 mg by mouth once daily.   Discontinued Medications    PANTOPRAZOLE (PROTONIX) 40 MG TABLET    Take 40 mg by mouth.       Assessment/Plans:-  1. Rheumatoid arthritis involving multiple sites with positive rheumatoid factor    2. Long term current use of immunosuppressive drug    3. Immunosuppressed status    4. Eosinophilia      Problem List Items Addressed This Visit        Immunology/Multi System    " Immunosuppressed status    Current Assessment & Plan     Compromised immune system secondary to autoimmune disease and use of immunosuppressive drugs. Monitor carefully for infections. Advised to get immediate medical care if any infection. Also advised strict adherence to age appropriate vaccinations and cancer screenings with PCP.            Relevant Orders    CBC auto differential    Comprehensive metabolic panel       Oncology    Eosinophilia    Current Assessment & Plan     Normal count per recent labs.  Continue follow-up with hematologist for nonspecific lymphadenopathies.  PET-CT normal per patient.            Orthopedic    Rheumatoid arthritis involving multiple sites with positive rheumatoid factor - Primary    Current Assessment & Plan     Seropositive severely progressive rheumatoid arthritis doing well on Orencia after failing methotrexate Enbrel and Actemra.  No flare-up since reducing dose of methotrexate to 5 mg weekly.  No synovitis on examination today.  Safety labs normal.  ESR CRP normal.  Continue Orencia and low-dose methotrexate.         Relevant Orders    CBC auto differential    Comprehensive metabolic panel       Other    Long term current use of immunosuppressive drug    Current Assessment & Plan     Hold Orencia if any infection.  Safety labs normal.  Repeat before next visit.         Relevant Orders    CBC auto differential    Comprehensive metabolic panel        # Follow up in about 4 months (around 11/30/2019).      Disclaimer: This note was prepared using voice recognition system and is likely to have sound alike errors and is not proof read.  Please call me with any questions.

## 2019-07-31 NOTE — ASSESSMENT & PLAN NOTE
Seropositive severely progressive rheumatoid arthritis doing well on Orencia after failing methotrexate Enbrel and Actemra.  No flare-up since reducing dose of methotrexate to 5 mg weekly.  No synovitis on examination today.  Safety labs normal.  ESR CRP normal.  Continue Orencia and low-dose methotrexate.

## 2019-08-15 ENCOUNTER — TELEPHONE (OUTPATIENT)
Dept: RHEUMATOLOGY | Facility: CLINIC | Age: 59
End: 2019-08-15

## 2019-08-15 DIAGNOSIS — M05.79 RHEUMATOID ARTHRITIS INVOLVING MULTIPLE SITES WITH POSITIVE RHEUMATOID FACTOR: ICD-10-CM

## 2019-08-15 DIAGNOSIS — D84.9 IMMUNOSUPPRESSED STATUS: ICD-10-CM

## 2019-08-15 DIAGNOSIS — Z79.899 LONG TERM CURRENT USE OF IMMUNOSUPPRESSIVE DRUG: ICD-10-CM

## 2019-08-15 NOTE — TELEPHONE ENCOUNTER
----- Message from Lucía Hanson sent at 8/14/2019  4:59 PM CDT -----  Regarding: Orencia prescription  This patient would like to apply for Orencia Financial Assistance Program. In order for [facility] to help her with this a prescription for Orencia would need to be sent in. Thank you for your Assistance in this matter.    Lucía Hanson  Patient Care Advocate  [facility]

## 2019-08-28 ENCOUNTER — HISTORICAL (OUTPATIENT)
Dept: RADIOLOGY | Facility: HOSPITAL | Age: 59
End: 2019-08-28

## 2019-08-28 ENCOUNTER — TELEPHONE (OUTPATIENT)
Dept: RHEUMATOLOGY | Facility: CLINIC | Age: 59
End: 2019-08-28

## 2019-08-28 NOTE — TELEPHONE ENCOUNTER
----- Message from Freddie Harrington sent at 8/28/2019  4:19 PM CDT -----  Contact: Pt  Pt would like to be called back asap regarding orencia injection samples(if available).    Pt can be reached at [phone].    Thanks

## 2019-08-28 NOTE — TELEPHONE ENCOUNTER
Returned patient phone call, patient would like to know if there are any orencia samples that she may have for the time being. She has switched insurances and is currently waiting for approval. Patient [place] away and would like to be notified of the response. I informed the patient that I would forward her message to Dr. Cronin. Patient verbalized understanding.

## 2019-08-28 NOTE — TELEPHONE ENCOUNTER
----- Message from Freddie Harrington sent at 8/28/2019  4:19 PM CDT -----  Contact: Pt  Pt would like to be called back asap regarding orencia injection samples(if available).    Pt can be reached at [phone].    Thanks

## 2019-08-29 ENCOUNTER — TELEPHONE (OUTPATIENT)
Dept: RHEUMATOLOGY | Facility: CLINIC | Age: 59
End: 2019-08-29

## 2019-08-29 NOTE — TELEPHONE ENCOUNTER
----- Message from Mirna Abernathy sent at 8/29/2019  9:28 AM CDT -----  [facility]   She is calling in regards to whether or not we should mail the patient's orencia to her home address or to the actual provider's office,please advise [phone]

## 2019-08-29 NOTE — TELEPHONE ENCOUNTER
----- Message from Annamarie Aranda sent at 8/29/2019  9:24 AM CDT -----  Contact: self [phone]  Type:  Patient Returning Call    Who Called:Katarzyna Shi  Who Left Message for Patient:Dr Mabry  Does the patient know what this is regarding?: na  Would the patient rather a call back or a response via [facility]? Call back   Best Call Back Number:[phone]  Additional Information: States that she is calling to speak to nurse. States that she received the message from Dr Mabry and would like to speak to nurse as soon as possible.

## 2019-08-29 NOTE — TELEPHONE ENCOUNTER
FOR DOCUMENTATION ONLY: Financial Assistance for Orencia is approved from 8/29/19 to 12/31/19  Source [employer] .Sending a staff message to   Dr Baron Mabry regarding assistance approval.

## 2019-08-29 NOTE — TELEPHONE ENCOUNTER
Spoke with Jay Prabhakar and informed her that Ms. Burroughs will have her medication shipped to her home due to due her having.     Spoke with patient who states that she will be on her way after lunch today to speak with him per his request on this morning. Dr. SHARMA notified.

## 2019-08-30 ENCOUNTER — DOCUMENTATION ONLY (OUTPATIENT)
Dept: RHEUMATOLOGY | Facility: CLINIC | Age: 59
End: 2019-08-30

## 2019-08-30 NOTE — NURSING
Lucía Mabry MD   Cc: FRANCISCO Draper Staff             Financial Assistance for Orencia has been approved from 8/29/19 to   12/31/19 thru the [employer] Program. We will reach out to the patient to notify of the approval.   Thank you

## 2019-10-09 LAB — RAPID GROUP A STREP (OHS): NEGATIVE

## 2019-10-28 LAB — BCS RECOMMENDATION EXT: NORMAL

## 2019-11-29 ENCOUNTER — TELEPHONE (OUTPATIENT)
Dept: RHEUMATOLOGY | Facility: CLINIC | Age: 59
End: 2019-11-29

## 2019-11-29 NOTE — TELEPHONE ENCOUNTER
----- Message from Bernadette Hwang sent at 11/29/2019 10:10 AM CST -----  Contact: patient  Patient called to reschedule an appointment specifically with Dr Mabry  And wishes to speak with a nurse regarding this matter.       she   can be reached at [phone]    Thanks  KB

## 2019-11-29 NOTE — TELEPHONE ENCOUNTER
Spoke with pt and scheduled appointment with Mary Grace Cheema PA-C for 12.18.19 at 2.30 pm. Pt verbalized understanding

## 2019-12-16 ENCOUNTER — TELEPHONE (OUTPATIENT)
Dept: RHEUMATOLOGY | Facility: CLINIC | Age: 59
End: 2019-12-16

## 2019-12-16 DIAGNOSIS — Z79.899 LONG TERM CURRENT USE OF IMMUNOSUPPRESSIVE DRUG: ICD-10-CM

## 2019-12-16 DIAGNOSIS — M05.79 RHEUMATOID ARTHRITIS INVOLVING MULTIPLE SITES WITH POSITIVE RHEUMATOID FACTOR: Primary | ICD-10-CM

## 2019-12-16 NOTE — TELEPHONE ENCOUNTER
Mrs. Jose Guadalupe Brody is seeing you on tomorrow and would like to know what labs need to be done. Kathleen can fax labs to [facility] and Mrs. Shi would like a call back

## 2019-12-16 NOTE — TELEPHONE ENCOUNTER
----- Message from Vicki Madrigal sent at 12/16/2019 11:54 AM CST -----  Contact: pt  Would like to consult with nurse regarding her request to have orders since to a different location. They haven't been sent yet she will like to follow up. Please give a call back at [phone]. Please fax it to [phone], pt is currently there.        Thanks,  Vicki KINCAID

## 2019-12-16 NOTE — TELEPHONE ENCOUNTER
----- Message from Vesta Ozuna sent at 12/16/2019 11:32 AM CST -----  Contact: Patient   Patient would like a call back at [phone], Regards to her labs she is at [facility] and they telling her there is no orders.    Thanks  Td

## 2019-12-17 NOTE — PROGRESS NOTES
Subjective:       Patient ID: Katarzyna Shi is a 59 y.o. female.    Chief Complaint: Disease Management    Katarzyna is here for f/u for Seropositive RA.  She is on orencia weekly injection.  She failed enbrel and actemra.  She is on low dose mtx 5mg/week.  Folic acid daily.  Doing well with no complaints of pain, denies joint swelling or tenderness.  Tolerating her medications well.      Past Medical History:   Diagnosis Date    Acid reflux     Anxiety     Arthritis     Atrial fibrillation     Clotting disorder     Emphysema of lung     Thyroid disease      Past Surgical History:   Procedure Laterality Date     SECTION       Family History   Problem Relation Age of Onset    Thyroid disease Mother     Alzheimer's disease Father      Social History     Socioeconomic History    Marital status:      Spouse name: Not on file    Number of children: Not on file    Years of education: Not on file    Highest education level: Not on file   Occupational History    Not on file   Social Needs    Financial resource strain: Not on file    Food insecurity:     Worry: Not on file     Inability: Not on file    Transportation needs:     Medical: Not on file     Non-medical: Not on file   Tobacco Use    Smoking status: Never Smoker    Smokeless tobacco: Never Used   Substance and Sexual Activity    Alcohol use: Not on file    Drug use: Not on file    Sexual activity: Not on file   Lifestyle    Physical activity:     Days per week: Not on file     Minutes per session: Not on file    Stress: Not on file   Relationships    Social connections:     Talks on phone: Not on file     Gets together: Not on file     Attends Jainism service: Not on file     Active member of club or organization: Not on file     Attends meetings of clubs or organizations: Not on file     Relationship status: Not on file   Other Topics Concern    Not on file   Social History Narrative    Not on file     Review of  "patient's allergies indicates:  No Known Allergies  Review of Systems   Constitutional: Negative for chills, fatigue and fever.   HENT: Negative for mouth sores, rhinorrhea and sore throat.    Eyes: Negative for pain and redness.   Respiratory: Negative for cough and shortness of breath.    Cardiovascular: Negative for chest pain.   Gastrointestinal: Negative for abdominal pain, constipation, diarrhea, nausea and vomiting.   Genitourinary: Negative for dysuria and hematuria.   Musculoskeletal: Negative for arthralgias, joint swelling and myalgias.   Skin: Negative for rash.   Neurological: Negative for weakness, numbness and headaches.   Psychiatric/Behavioral: The patient is not nervous/anxious.          Objective:   Ht 5' 2" (1.575 m)   Wt 54.5 kg (120 lb 2.4 oz)   BMI 21.98 kg/m²     There is no immunization history on file for this patient.           Physical Exam   Constitutional: She is oriented to person, place, and time and well-developed, well-nourished, and in no distress.   HENT:   Head: Normocephalic and atraumatic.   Eyes: Pupils are equal, round, and reactive to light. Right eye exhibits no discharge.   Neck: Normal range of motion.   Cardiovascular: Normal rate, regular rhythm and normal heart sounds.  Exam reveals no friction rub.    Pulmonary/Chest: Effort normal and breath sounds normal. No respiratory distress.   Abdominal: Soft. She exhibits no distension. There is no tenderness.   Lymphadenopathy:     She has no cervical adenopathy.   Neurological: She is alert and oriented to person, place, and time.   Skin: No rash noted. No erythema.     Psychiatric: Mood normal.   Musculoskeletal: Normal range of motion. She exhibits no edema or deformity.   Left wrist decreased rom.     jules 2mcp thickened synovium without acute synovitis.   Otherwise mcps, pips wnl,   Elbows shoulders no swelling or tenderness, full rom   jules knees no effusion warmth or tenderness.              Recent Results (from the " past 336 hour(s))   Comprehensive metabolic panel    Collection Time: 12/17/19 10:44 AM   Result Value Ref Range    Glucose 80 65 - 99 mg/dL    BUN, Bld 16 7 - 25 mg/dL    Creatinine 0.63 0.50 - 1.05 mg/dL    eGFR if non  100 > OR = 60 mL/min/1.73m2    eGFR if African American 115 > OR = 60 mL/min/1.73m2    BUN/Creatinine Ratio NOT APPLICABLE 6 - 22 (calc)    Sodium 140 135 - 146 mmol/L    Potassium 4.1 3.5 - 5.3 mmol/L    Chloride 102 98 - 110 mmol/L    CO2 30 20 - 32 mmol/L    Calcium 9.4 8.6 - 10.4 mg/dL    Total Protein 6.4 6.1 - 8.1 g/dL    Albumin 4.3 3.6 - 5.1 g/dL    Globulin, Total 2.1 1.9 - 3.7 g/dL (calc)    Albumin/Globulin Ratio 2.0 1.0 - 2.5 (calc)    Total Bilirubin 0.6 0.2 - 1.2 mg/dL    Alkaline Phosphatase 90 33 - 130 U/L    AST 13 10 - 35 U/L    ALT 14 6 - 29 U/L   Sedimentation rate, automated    Collection Time: 12/17/19 10:44 AM   Result Value Ref Range    Sed Rate 2 < OR = 30 mm/h   CBC auto differential    Collection Time: 12/17/19 10:44 AM   Result Value Ref Range    WBC 7.7 3.8 - 10.8 Thousand/uL    RBC 4.48 3.80 - 5.10 Million/uL    Hemoglobin 13.4 11.7 - 15.5 g/dL    Hematocrit 39.6 35.0 - 45.0 %    Mean Corpuscular Volume 88.4 80.0 - 100.0 fL    Mean Corpuscular Hemoglobin 29.9 27.0 - 33.0 pg    Mean Corpuscular Hemoglobin Conc 33.8 32.0 - 36.0 g/dL    RDW 13.5 11.0 - 15.0 %    Platelets 315 140 - 400 Thousand/uL    MPV 10.2 7.5 - 12.5 fL    Neutrophils Absolute 4,166 1,500 - 7,800 cells/uL    Lymph # 2,664 850 - 3,900 cells/uL    Mono # 670 200 - 950 cells/uL    Eos # 131 15 - 500 cells/uL    Baso # 69 0 - 200 cells/uL    Neutrophils Relative 54.1 %    Lymph% 34.6 %    Mono% 8.7 %    Eosinophil% 1.7 %    Basophil% 0.9 %   C-reactive protein    Collection Time: 12/17/19 10:44 AM   Result Value Ref Range    CRP            Lab Results   Component Value Date    TBGOLDPLUS Negative 05/14/2019      Lab Results   Component Value Date    HEPAIGM Negative 10/11/2011    HEPBIGM  Negative 10/11/2011    HEPBCAB Negative 05/14/2019    HEPCAB Negative 05/14/2019        Assessment:       1. Rheumatoid arthritis involving multiple sites with positive rheumatoid factor    2. Long term current use of immunosuppressive drug    3. High risk medication use    4. Immunosuppressed status          Impression:   Seropositive RA: stable on orencia 125mg weekly SC, mtx 5mg/wek, daily FA; failed enbrel and actemra, getting orencia through [facility]     Immunocompromised: not utd, but not wanting vaccines    Hi risk med use: toxicity labs reveiwed, no issues  Plan:       Continue orencia 125mg/week SC, getting through co. She is getting the syringe but wants the clickjet, we can send in new rx to company when/if she needs.     Cont mtx 5mg week w daily folic acid she is doing great w her joints and wants to remain on this treatment regimen     Declining hi dose flu vaccine today. Discussed vaccines and our recommendations to get up to date     Do not take orencia/mtx if having fever, have an infection,  on an antibiotic, or having surgery in next week. Stay off 1-2 weeks until infection gone or healed from surgery. Call us if any question      See back in 4 mos with dr SHARMA and reg 4 labs

## 2019-12-18 ENCOUNTER — OFFICE VISIT (OUTPATIENT)
Dept: RHEUMATOLOGY | Facility: CLINIC | Age: 59
End: 2019-12-18
Payer: COMMERCIAL

## 2019-12-18 VITALS — WEIGHT: 120.13 LBS | HEIGHT: 62 IN | BODY MASS INDEX: 22.11 KG/M2

## 2019-12-18 DIAGNOSIS — Z79.899 LONG TERM CURRENT USE OF IMMUNOSUPPRESSIVE DRUG: ICD-10-CM

## 2019-12-18 DIAGNOSIS — D84.9 IMMUNOSUPPRESSED STATUS: ICD-10-CM

## 2019-12-18 DIAGNOSIS — Z79.899 HIGH RISK MEDICATION USE: ICD-10-CM

## 2019-12-18 DIAGNOSIS — M05.79 RHEUMATOID ARTHRITIS INVOLVING MULTIPLE SITES WITH POSITIVE RHEUMATOID FACTOR: Primary | ICD-10-CM

## 2019-12-18 LAB
ALBUMIN SERPL-MCNC: 4.3 G/DL (ref 3.6–5.1)
ALBUMIN/GLOB SERPL: 2 (CALC) (ref 1–2.5)
ALP SERPL-CCNC: 90 U/L (ref 33–130)
ALT SERPL-CCNC: 14 U/L (ref 6–29)
AST SERPL-CCNC: 13 U/L (ref 10–35)
BASOPHILS # BLD AUTO: 69 CELLS/UL (ref 0–200)
BASOPHILS NFR BLD AUTO: 0.9 %
BILIRUB SERPL-MCNC: 0.6 MG/DL (ref 0.2–1.2)
BUN SERPL-MCNC: 16 MG/DL (ref 7–25)
BUN/CREAT SERPL: NORMAL (CALC) (ref 6–22)
CALCIUM SERPL-MCNC: 9.4 MG/DL (ref 8.6–10.4)
CHLORIDE SERPL-SCNC: 102 MMOL/L (ref 98–110)
CO2 SERPL-SCNC: 30 MMOL/L (ref 20–32)
CREAT SERPL-MCNC: 0.63 MG/DL (ref 0.5–1.05)
CRP SERPL-MCNC: 1.3 MG/L
EOSINOPHIL # BLD AUTO: 131 CELLS/UL (ref 15–500)
EOSINOPHIL NFR BLD AUTO: 1.7 %
ERYTHROCYTE [DISTWIDTH] IN BLOOD BY AUTOMATED COUNT: 13.5 % (ref 11–15)
ERYTHROCYTE [SEDIMENTATION RATE] IN BLOOD BY WESTERGREN METHOD: 2 MM/H
GFRSERPLBLD MDRD-ARVRAT: 100 ML/MIN/1.73M2
GLOBULIN SER CALC-MCNC: 2.1 G/DL (CALC) (ref 1.9–3.7)
GLUCOSE SERPL-MCNC: 80 MG/DL (ref 65–99)
HCT VFR BLD AUTO: 39.6 % (ref 35–45)
HGB BLD-MCNC: 13.4 G/DL (ref 11.7–15.5)
LYMPHOCYTES # BLD AUTO: 2664 CELLS/UL (ref 850–3900)
LYMPHOCYTES NFR BLD AUTO: 34.6 %
MCH RBC QN AUTO: 29.9 PG (ref 27–33)
MCHC RBC AUTO-ENTMCNC: 33.8 G/DL (ref 32–36)
MCV RBC AUTO: 88.4 FL (ref 80–100)
MONOCYTES # BLD AUTO: 670 CELLS/UL (ref 200–950)
MONOCYTES NFR BLD AUTO: 8.7 %
NEUTROPHILS # BLD AUTO: 4166 CELLS/UL (ref 1500–7800)
NEUTROPHILS NFR BLD AUTO: 54.1 %
PLATELET # BLD AUTO: 315 THOUSAND/UL (ref 140–400)
PMV BLD REES-ECKER: 10.2 FL (ref 7.5–12.5)
POTASSIUM SERPL-SCNC: 4.1 MMOL/L (ref 3.5–5.3)
PROT SERPL-MCNC: 6.4 G/DL (ref 6.1–8.1)
RBC # BLD AUTO: 4.48 MILLION/UL (ref 3.8–5.1)
SODIUM SERPL-SCNC: 140 MMOL/L (ref 135–146)
WBC # BLD AUTO: 7.7 THOUSAND/UL (ref 3.8–10.8)

## 2019-12-18 PROCEDURE — 99213 OFFICE O/P EST LOW 20 MIN: CPT | Mod: PBBFAC | Performed by: PHYSICIAN ASSISTANT

## 2019-12-18 PROCEDURE — 99999 PR PBB SHADOW E&M-EST. PATIENT-LVL III: ICD-10-PCS | Mod: PBBFAC,,, | Performed by: PHYSICIAN ASSISTANT

## 2019-12-18 PROCEDURE — 99214 OFFICE O/P EST MOD 30 MIN: CPT | Mod: S$PBB,,, | Performed by: PHYSICIAN ASSISTANT

## 2019-12-18 PROCEDURE — 99214 PR OFFICE/OUTPT VISIT, EST, LEVL IV, 30-39 MIN: ICD-10-PCS | Mod: S$PBB,,, | Performed by: PHYSICIAN ASSISTANT

## 2019-12-18 PROCEDURE — 99999 PR PBB SHADOW E&M-EST. PATIENT-LVL III: CPT | Mod: PBBFAC,,, | Performed by: PHYSICIAN ASSISTANT

## 2019-12-18 NOTE — PATIENT INSTRUCTIONS
Hold methotrexate the week of flu vaccine --you need hi dose vaccine so call us and we will give     Hold orencia and methotrexate in the future IF you get sick, fever, antibiotic, planning surgery

## 2020-01-06 ENCOUNTER — TELEPHONE (OUTPATIENT)
Dept: RHEUMATOLOGY | Facility: CLINIC | Age: 60
End: 2020-01-06

## 2020-01-06 NOTE — TELEPHONE ENCOUNTER
FOR DOCUMENTATION ONLY: Financial Assistance for Orencia is approved from 01/04/20 to 12/31/20  Source [employer] .Sending a staff message to Dr Baron Mabry,   regarding assistance approval.

## 2020-01-06 NOTE — TELEPHONE ENCOUNTER
----- Message from Lucía Hanson sent at 1/6/2020 10:50 AM CST -----  Regarding: Orencia Patient Assistance Approval  Financial Assistance for Orencia has been approved from 01/04/20 to 12/31/20 thru the [employer] Program. We will reach out to the patient to notify of the approval.   Thank you    Lucía Hanson, [facility]  [id number]

## 2020-01-17 ENCOUNTER — TELEPHONE (OUTPATIENT)
Dept: RHEUMATOLOGY | Facility: CLINIC | Age: 60
End: 2020-01-17

## 2020-01-28 ENCOUNTER — TELEPHONE (OUTPATIENT)
Dept: RHEUMATOLOGY | Facility: CLINIC | Age: 60
End: 2020-01-28

## 2020-01-28 NOTE — TELEPHONE ENCOUNTER
Patient states that the pharmacy is getting a rejection for her Methotrexate. Patient will contact insurance to find out why they are no longer covering it because she has been taking it for years.    Patient will let us know if will can be of assistance

## 2020-01-28 NOTE — TELEPHONE ENCOUNTER
----- Message from Shirley Rod sent at 1/28/2020 12:51 PM CST -----  Contact: PATIENT  CALLING CONCERNING HER RX FOR METHOTREXATE WAS DENIED AND WANT TO KNOW WHY. PLEASE CALL PATIENT ASAP TODAY @ [phone]. THANKS, PAKO      [id number] - [place] - [address]  [address]  [facility] [address]  Phone: [phone] Fax: [phone]

## 2020-01-29 ENCOUNTER — TELEPHONE (OUTPATIENT)
Dept: RHEUMATOLOGY | Facility: CLINIC | Age: 60
End: 2020-01-29

## 2020-01-30 DIAGNOSIS — M05.79 RHEUMATOID ARTHRITIS INVOLVING MULTIPLE SITES WITH POSITIVE RHEUMATOID FACTOR: ICD-10-CM

## 2020-01-30 RX ORDER — METHOTREXATE 2.5 MG/1
7.5 TABLET ORAL
Qty: 36 TABLET | Refills: 1 | Status: SHIPPED | OUTPATIENT
Start: 2020-01-30 | End: 2020-07-16 | Stop reason: ALTCHOICE

## 2020-02-18 LAB
INFLUENZA A ANTIGEN, POC: NEGATIVE
INFLUENZA B ANTIGEN, POC: NEGATIVE

## 2020-03-20 ENCOUNTER — TELEPHONE (OUTPATIENT)
Dept: RHEUMATOLOGY | Facility: CLINIC | Age: 60
End: 2020-03-20

## 2020-03-20 NOTE — TELEPHONE ENCOUNTER
Pt stated she is concerned with talking orencia with the covid19 going around, stated she didn't take it last week and the week before. Would like to know if she should resume taking it. Please advise.

## 2020-03-20 NOTE — TELEPHONE ENCOUNTER
----- Message from Josselin Hidalgo sent at 3/20/2020  9:40 AM CDT -----  Contact: pt  Pt would like to be called back regarding medication (orencia) wants to know if she should augie to take   Pt can be reached at [phone]

## 2020-04-13 ENCOUNTER — TELEPHONE (OUTPATIENT)
Dept: RHEUMATOLOGY | Facility: CLINIC | Age: 60
End: 2020-04-13

## 2020-04-16 ENCOUNTER — TELEPHONE (OUTPATIENT)
Dept: RHEUMATOLOGY | Facility: CLINIC | Age: 60
End: 2020-04-16

## 2020-04-16 NOTE — TELEPHONE ENCOUNTER
Pt would like to cancel appt on 4-21-20, stated she is doing fine being off MTX per md request, no c/o anything, will like to reschedule in a few months.

## 2020-04-21 DIAGNOSIS — M05.79 RHEUMATOID ARTHRITIS INVOLVING MULTIPLE SITES WITH POSITIVE RHEUMATOID FACTOR: ICD-10-CM

## 2020-04-21 RX ORDER — FOLIC ACID 1 MG/1
TABLET ORAL
Qty: 90 TABLET | Refills: 1 | Status: SHIPPED | OUTPATIENT
Start: 2020-04-21 | End: 2020-07-16 | Stop reason: ALTCHOICE

## 2020-07-13 ENCOUNTER — PATIENT MESSAGE (OUTPATIENT)
Dept: RHEUMATOLOGY | Facility: CLINIC | Age: 60
End: 2020-07-13

## 2020-07-13 DIAGNOSIS — M05.79 RHEUMATOID ARTHRITIS INVOLVING MULTIPLE SITES WITH POSITIVE RHEUMATOID FACTOR: Primary | ICD-10-CM

## 2020-07-16 ENCOUNTER — OFFICE VISIT (OUTPATIENT)
Dept: RHEUMATOLOGY | Facility: CLINIC | Age: 60
End: 2020-07-16
Payer: MEDICARE

## 2020-07-16 DIAGNOSIS — M05.79 RHEUMATOID ARTHRITIS INVOLVING MULTIPLE SITES WITH POSITIVE RHEUMATOID FACTOR: ICD-10-CM

## 2020-07-16 DIAGNOSIS — Z79.899 LONG TERM CURRENT USE OF IMMUNOSUPPRESSIVE DRUG: ICD-10-CM

## 2020-07-16 DIAGNOSIS — D84.9 IMMUNOSUPPRESSED STATUS: ICD-10-CM

## 2020-07-16 LAB
ALBUMIN SERPL-MCNC: 4.6 G/DL (ref 3.6–5.1)
ALBUMIN/GLOB SERPL: 2 (CALC) (ref 1–2.5)
ALP SERPL-CCNC: 94 U/L (ref 37–153)
ALT SERPL-CCNC: 14 U/L (ref 6–29)
AST SERPL-CCNC: 14 U/L (ref 10–35)
BASOPHILS # BLD AUTO: 90 CELLS/UL (ref 0–200)
BASOPHILS NFR BLD AUTO: 1.2 %
BILIRUB SERPL-MCNC: 0.6 MG/DL (ref 0.2–1.2)
BUN SERPL-MCNC: 12 MG/DL (ref 7–25)
BUN/CREAT SERPL: NORMAL (CALC) (ref 6–22)
CALCIUM SERPL-MCNC: 9.5 MG/DL (ref 8.6–10.4)
CHLORIDE SERPL-SCNC: 103 MMOL/L (ref 98–110)
CO2 SERPL-SCNC: 29 MMOL/L (ref 20–32)
CREAT SERPL-MCNC: 0.55 MG/DL (ref 0.5–1.05)
CRP SERPL-MCNC: 1.9 MG/L
EOSINOPHIL # BLD AUTO: 233 CELLS/UL (ref 15–500)
EOSINOPHIL NFR BLD AUTO: 3.1 %
ERYTHROCYTE [DISTWIDTH] IN BLOOD BY AUTOMATED COUNT: 13.3 % (ref 11–15)
ERYTHROCYTE [SEDIMENTATION RATE] IN BLOOD BY WESTERGREN METHOD: 2 MM/H
GFRSERPLBLD MDRD-ARVRAT: 103 ML/MIN/1.73M2
GLOBULIN SER CALC-MCNC: 2.3 G/DL (CALC) (ref 1.9–3.7)
GLUCOSE SERPL-MCNC: 92 MG/DL (ref 65–99)
HCT VFR BLD AUTO: 41.8 % (ref 35–45)
HGB BLD-MCNC: 13.5 G/DL (ref 11.7–15.5)
LYMPHOCYTES # BLD AUTO: 2835 CELLS/UL (ref 850–3900)
LYMPHOCYTES NFR BLD AUTO: 37.8 %
MCH RBC QN AUTO: 28.9 PG (ref 27–33)
MCHC RBC AUTO-ENTMCNC: 32.3 G/DL (ref 32–36)
MCV RBC AUTO: 89.5 FL (ref 80–100)
MONOCYTES # BLD AUTO: 660 CELLS/UL (ref 200–950)
MONOCYTES NFR BLD AUTO: 8.8 %
NEUTROPHILS # BLD AUTO: 3683 CELLS/UL (ref 1500–7800)
NEUTROPHILS NFR BLD AUTO: 49.1 %
PLATELET # BLD AUTO: 304 THOUSAND/UL (ref 140–400)
PMV BLD REES-ECKER: 11.1 FL (ref 7.5–12.5)
POTASSIUM SERPL-SCNC: 4.1 MMOL/L (ref 3.5–5.3)
PROT SERPL-MCNC: 6.9 G/DL (ref 6.1–8.1)
RBC # BLD AUTO: 4.67 MILLION/UL (ref 3.8–5.1)
SODIUM SERPL-SCNC: 140 MMOL/L (ref 135–146)
WBC # BLD AUTO: 7.5 THOUSAND/UL (ref 3.8–10.8)

## 2020-07-16 PROCEDURE — 99214 OFFICE O/P EST MOD 30 MIN: CPT | Mod: 95,,, | Performed by: INTERNAL MEDICINE

## 2020-07-16 PROCEDURE — 99214 PR OFFICE/OUTPT VISIT, EST, LEVL IV, 30-39 MIN: ICD-10-PCS | Mod: 95,,, | Performed by: INTERNAL MEDICINE

## 2020-07-16 RX ORDER — ABATACEPT 125 MG/ML
125 INJECTION, SOLUTION SUBCUTANEOUS
Qty: 4 ML | Refills: 5
Start: 2020-07-16 | End: 2021-08-16 | Stop reason: ALTCHOICE

## 2020-07-16 NOTE — PROGRESS NOTES
RHEUMATOLOGY FOLLOW UP - TELE VISIT     The patient location is: [place]  The chief complaint leading to consultation is:  FOLLOW-UP FOR RHEUMATOID ARTHRITIS  Visit type: Virtual visit with synchronous audio and video  Total time spent with patient: 12mins  Each patient to whom he or she provides medical services by telemedicine is:  (1) informed of the relationship between the physician and patient and the respective role of any other health care provider with respect to management of the patient; and (2) notified that he or she may decline to receive medical services by telemedicine and may withdraw from such care at any time.    HPI:-  Katarzyna MAI Joshua a 60 y.o. pleasant female seen today through My chart video visit for follow up.  She follows up in the Rheumatology Clinic for rheumatoid arthritis.  She reports doing well today.  No flare-up of rheumatoid arthritis since discontinuing methotrexate.  She even reduce the dose of Orencia every other week and have not found any flare-up in her symptoms.  No morning stiffness.  No joint pain today.  No adverse effects from medication.  No infections since last visit.    Review of Systems   Constitutional: Negative for chills and fever.   HENT: Negative for congestion and sore throat.    Eyes: Negative for blurred vision and redness.   Respiratory: Negative for cough and shortness of breath.    Cardiovascular: Negative for chest pain and leg swelling.   Gastrointestinal: Negative for abdominal pain.   Genitourinary: Negative for dysuria.   Musculoskeletal: Negative for back pain, falls, joint pain, myalgias and neck pain.   Skin: Negative for rash.   Neurological: Negative for headaches.   Endo/Heme/Allergies: Does not bruise/bleed easily.   Psychiatric/Behavioral: Negative for memory loss. The patient does not have insomnia.        Past Medical History:   Diagnosis Date    Acid reflux     Anxiety     Arthritis     Atrial fibrillation     Clotting disorder      Emphysema of lung     Thyroid disease        Past Surgical History:   Procedure Laterality Date     SECTION          Social History     Tobacco Use    Smoking status: Never Smoker    Smokeless tobacco: Never Used   Substance Use Topics    Alcohol use: Not on file    Drug use: Not on file       Family History   Problem Relation Age of Onset    Thyroid disease Mother     Alzheimer's disease Father        Review of patient's allergies indicates:  No Known Allergies    Physical exam:-    GEN: awake, alert, non-diaphoretic, no psychomotor agitation, no acute distress    HEENT :Head: atraumatic, normocephalic, no rashes noted, no lesions noted;    Eyes: NO redness, discharge, swelling, or lesions    Nose: NO redness, swelling, discharge, deformity, or impetigo/crusting    Skin: no lesions, wounds, erythema, or cyanosis noted on face or hands    Cardiopulmonary: no increased respiratory effort, speaking in clear sentences    MSK: normal ROM in the neck, Upper extremities, Lower extremities  Good ROM of hands, fist formation 100% and , no obvious synovitis    Good ambulation in front of the camera    Neuro: cranial nerves grossly normal, speech normal rate and rhythm, orientation arrived to appointment on time with no prompting, moved both upper extremities equally    Pysch:  appearance, behavior, and attitude- well groomed, pleasant, cooperative    Medication List with Changes/Refills   Current Medications    ASPIRIN (ECOTRIN) 81 MG EC TABLET    Take 81 mg by mouth.    DIGOXIN (LANOXIN) 250 MCG TABLET    Take 0.25 mg by mouth once daily.    VERAPAMIL (VERELAN) 120 MG C24P    Take 120 mg by mouth once daily.   Changed and/or Refilled Medications    Modified Medication Previous Medication    ABATACEPT (ORENCIA CLICKJECT) 125 MG/ML ATIN abatacept (ORENCIA CLICKJECT) 125 mg/mL AtIn       Inject 125 mg into the skin every 7 days.    Inject 125 mg into the skin every 7 days.   Discontinued Medications     CEPHALEXIN (KEFLEX) 500 MG CAPSULE    Take 1 capsule (500 mg total) by mouth every 12 (twelve) hours.    FOLIC ACID (FOLVITE) 1 MG TABLET    TAKE 1 TABLET BY MOUTH EVERY DAY    METHOTREXATE 2.5 MG TAB    Take 3 tablets (7.5 mg total) by mouth every 7 days.       Assessment/Plans:-  1. Rheumatoid arthritis involving multiple sites with positive rheumatoid factor    2. Long term current use of immunosuppressive drug    3. Immunosuppressed status      Problem List Items Addressed This Visit     Rheumatoid arthritis involving multiple sites with positive rheumatoid factor    Current Assessment & Plan     Positive nonerosive rheumatoid arthritis doing well on Orencia monotherapy.  She takes Orencia injection every other week without any flares.  Discontinued methotrexate.  Failed Enbrel and Actemra in the past.  No synovitis.  Continue Orencia every other week.  Safety labs normal.         Relevant Medications    abatacept (ORENCIA CLICKJECT) 125 mg/mL AtIn    Long term current use of immunosuppressive drug    Current Assessment & Plan     Orencia if any infection.  Safety labs normal.         Relevant Medications    abatacept (ORENCIA CLICKJECT) 125 mg/mL AtIn    Immunosuppressed status    Current Assessment & Plan     Compromised immune system secondary to autoimmune disease and use of immunosuppressive drugs. Monitor carefully for infections. Advised to get immediate medical care if any infection. Also advised strict adherence to age appropriate vaccinations and cancer screenings with PCP.          Relevant Medications    abatacept (ORENCIA CLICKJECT) 125 mg/mL AtIn          Follow up in about 6 months (around 1/16/2021).    Disclaimer: This note was prepared using voice recognition system and is likely to have sound alike errors and is not proof read.  Please call me with any questions.

## 2020-07-16 NOTE — ASSESSMENT & PLAN NOTE
Positive nonerosive rheumatoid arthritis doing well on Orencia monotherapy.  She takes Orencia injection every other week without any flares.  Discontinued methotrexate.  Failed Enbrel and Actemra in the past.  No synovitis.  Continue Orencia every other week.  Safety labs normal.

## 2020-11-13 ENCOUNTER — HISTORICAL (OUTPATIENT)
Dept: ADMINISTRATIVE | Facility: HOSPITAL | Age: 60
End: 2020-11-13

## 2020-11-13 ENCOUNTER — TELEPHONE (OUTPATIENT)
Dept: RHEUMATOLOGY | Facility: CLINIC | Age: 60
End: 2020-11-13

## 2020-11-13 LAB
ABS NEUT (OLG): 4.02 X10(3)/MCL (ref 2.1–9.2)
ALBUMIN SERPL-MCNC: 4.2 GM/DL (ref 3.5–5)
ALBUMIN/GLOB SERPL: 1.6 RATIO (ref 1.1–2)
ALP SERPL-CCNC: 118 UNIT/L (ref 40–150)
ALT SERPL-CCNC: 15 UNIT/L (ref 0–55)
APPEARANCE, UA: CLEAR
AST SERPL-CCNC: 15 UNIT/L (ref 5–34)
BACTERIA SPEC CULT: NORMAL /HPF
BASOPHILS # BLD AUTO: 0.1 X10(3)/MCL (ref 0–0.2)
BASOPHILS NFR BLD AUTO: 1 %
BILIRUB SERPL-MCNC: 0.4 MG/DL
BILIRUB UR QL STRIP: NEGATIVE
BILIRUBIN DIRECT+TOT PNL SERPL-MCNC: 0.2 MG/DL (ref 0–0.5)
BILIRUBIN DIRECT+TOT PNL SERPL-MCNC: 0.2 MG/DL (ref 0–0.8)
BUN SERPL-MCNC: 10.6 MG/DL (ref 9.8–20.1)
CALCIUM SERPL-MCNC: 9.1 MG/DL (ref 8.4–10.2)
CHLORIDE SERPL-SCNC: 106 MMOL/L (ref 98–107)
CO2 SERPL-SCNC: 26 MMOL/L (ref 22–29)
COLOR UR: YELLOW
CREAT SERPL-MCNC: 0.64 MG/DL (ref 0.55–1.02)
DIGOXIN SERPL-MCNC: 1.1 NG/ML (ref 0.8–2)
EOSINOPHIL # BLD AUTO: 0.4 X10(3)/MCL (ref 0–0.9)
EOSINOPHIL NFR BLD AUTO: 4 %
ERYTHROCYTE [DISTWIDTH] IN BLOOD BY AUTOMATED COUNT: 13.1 % (ref 11.5–17)
GLOBULIN SER-MCNC: 2.7 GM/DL (ref 2.4–3.5)
GLUCOSE (UA): NEGATIVE
GLUCOSE SERPL-MCNC: 88 MG/DL (ref 74–100)
HCT VFR BLD AUTO: 42.1 % (ref 37–47)
HGB BLD-MCNC: 13.4 GM/DL (ref 12–16)
HGB UR QL STRIP: NEGATIVE
KETONES UR QL STRIP: NEGATIVE
LEUKOCYTE ESTERASE UR QL STRIP: NEGATIVE
LYMPHOCYTES # BLD AUTO: 3.3 X10(3)/MCL (ref 0.6–4.6)
LYMPHOCYTES NFR BLD AUTO: 38 %
MCH RBC QN AUTO: 29.3 PG (ref 27–31)
MCHC RBC AUTO-ENTMCNC: 31.8 GM/DL (ref 33–36)
MCV RBC AUTO: 91.9 FL (ref 80–94)
MONOCYTES # BLD AUTO: 0.9 X10(3)/MCL (ref 0.1–1.3)
MONOCYTES NFR BLD AUTO: 10 %
NEUTROPHILS # BLD AUTO: 4.02 X10(3)/MCL (ref 2.1–9.2)
NEUTROPHILS NFR BLD AUTO: 46 %
NITRITE UR QL STRIP: NEGATIVE
PH UR STRIP: 7 [PH] (ref 5–9)
PLATELET # BLD AUTO: 312 X10(3)/MCL (ref 130–400)
PMV BLD AUTO: 10.3 FL (ref 9.4–12.4)
POTASSIUM SERPL-SCNC: 4 MMOL/L (ref 3.5–5.1)
PROT SERPL-MCNC: 6.9 GM/DL (ref 6.4–8.3)
PROT UR QL STRIP: NEGATIVE
RBC # BLD AUTO: 4.58 X10(6)/MCL (ref 4.2–5.4)
RBC #/AREA URNS HPF: NORMAL /[HPF]
SODIUM SERPL-SCNC: 141 MMOL/L (ref 136–145)
SP GR UR STRIP: 1.01 (ref 1–1.03)
SQUAMOUS EPITHELIAL, UA: NORMAL
UROBILINOGEN UR STRIP-ACNC: 0.2
WBC # SPEC AUTO: 8.7 X10(3)/MCL (ref 4.5–11.5)
WBC #/AREA URNS HPF: NORMAL /[HPF]

## 2020-11-13 NOTE — TELEPHONE ENCOUNTER
Called patient regarding appointment on 1.18.2021 needing to be rescheduled due to clinic being closed, no answer, left message.

## 2020-12-03 ENCOUNTER — TELEPHONE (OUTPATIENT)
Dept: RHEUMATOLOGY | Facility: CLINIC | Age: 60
End: 2020-12-03

## 2020-12-03 NOTE — TELEPHONE ENCOUNTER
Spoke with pt and for the last 4/5 weeks she has been having colds and ear infections. States that it will get better and then come back. Started her 4th round of ABX (amoxicillin) this morning and was other 3 others prior and received a kenalog injection yesterday. States her internist advised her to start on a probiotic and she looked them up and it shows an interaction with her Orencia. Pt would like to know if she can take probiotics with her Orencia. States that she has not taken Orencia in about 4 weeks. Pt would like to try to get better ASAP since she is going on a 2 week ski trip last this month

## 2020-12-03 NOTE — TELEPHONE ENCOUNTER
----- Message from Marlena Madrigal sent at 12/3/2020 10:14 AM CST -----  Regarding: Medical Advice  Contact: Patient  Patient is requesting a callback from medical staff regarding medication   Patient stated she had a few questions regarding meds    Patient can be reached at [phone]

## 2020-12-04 ENCOUNTER — TELEPHONE (OUTPATIENT)
Dept: RHEUMATOLOGY | Facility: CLINIC | Age: 60
End: 2020-12-04

## 2020-12-04 NOTE — TELEPHONE ENCOUNTER
MD Erlinda Ashton MA; P Rivera Finch Staff   Caller: Unspecified (Today,  9:52 AM)             Communicated with patient via telephone.  Informed it would be okay to take probiotic while on antibiotic therapy.  Continue to hold biologic until deemed healed from current infection.  May resume prior regimen of Orencia after completely healed from infection.    Previous Messages

## 2020-12-04 NOTE — TELEPHONE ENCOUNTER
"Patient very concerned. She is on her 4 th round of antibiotics for a  " really bad cold and double ear infection " . She is off Orencia thru all this . She is currently taking Augmentin /Amoxicillin 875  Twice daily  And Amoxicillin 500 mg twice daily. She was told by 2 physicians that she should be on a Probiotic to build up her good bacteria. Patient states that she read on the antibiotics that she may develop a rare reaction to rice and or yeast if she is taking them with  Orencia  . Patient wants to know if she should take the Probiotics ?   "

## 2021-01-04 ENCOUNTER — TELEPHONE (OUTPATIENT)
Dept: RHEUMATOLOGY | Facility: CLINIC | Age: 61
End: 2021-01-04

## 2021-01-28 ENCOUNTER — TELEPHONE (OUTPATIENT)
Dept: RHEUMATOLOGY | Facility: CLINIC | Age: 61
End: 2021-01-28

## 2021-02-01 ENCOUNTER — PATIENT MESSAGE (OUTPATIENT)
Dept: RHEUMATOLOGY | Facility: CLINIC | Age: 61
End: 2021-02-01

## 2021-02-01 ENCOUNTER — TELEPHONE (OUTPATIENT)
Dept: RHEUMATOLOGY | Facility: CLINIC | Age: 61
End: 2021-02-01

## 2021-02-01 ENCOUNTER — OFFICE VISIT (OUTPATIENT)
Dept: RHEUMATOLOGY | Facility: CLINIC | Age: 61
End: 2021-02-01
Payer: COMMERCIAL

## 2021-02-01 DIAGNOSIS — D84.9 IMMUNOSUPPRESSED STATUS: ICD-10-CM

## 2021-02-01 DIAGNOSIS — Z51.81 ENCOUNTER FOR MEDICATION MONITORING: ICD-10-CM

## 2021-02-01 DIAGNOSIS — Z79.899 LONG TERM CURRENT USE OF IMMUNOSUPPRESSIVE DRUG: ICD-10-CM

## 2021-02-01 DIAGNOSIS — M05.79 RHEUMATOID ARTHRITIS INVOLVING MULTIPLE SITES WITH POSITIVE RHEUMATOID FACTOR: Primary | ICD-10-CM

## 2021-02-01 PROCEDURE — 99214 OFFICE O/P EST MOD 30 MIN: CPT | Mod: 95,,, | Performed by: INTERNAL MEDICINE

## 2021-02-01 PROCEDURE — 99214 PR OFFICE/OUTPT VISIT, EST, LEVL IV, 30-39 MIN: ICD-10-PCS | Mod: 95,,, | Performed by: INTERNAL MEDICINE

## 2021-02-01 RX ORDER — METHYLPREDNISOLONE 4 MG/1
TABLET ORAL
Qty: 1 PACKAGE | Refills: 0 | Status: SHIPPED | OUTPATIENT
Start: 2021-02-01 | End: 2022-03-23

## 2021-02-05 ENCOUNTER — HISTORICAL (OUTPATIENT)
Dept: LAB | Facility: HOSPITAL | Age: 61
End: 2021-02-05

## 2021-02-05 LAB
ABS NEUT (OLG): 5.14 X10(3)/MCL (ref 2.1–9.2)
ALBUMIN SERPL-MCNC: 4.1 GM/DL (ref 3.5–5)
ALBUMIN/GLOB SERPL: 1.5 RATIO (ref 1.1–2)
ALP SERPL-CCNC: 78 UNIT/L (ref 40–150)
ALT SERPL-CCNC: 16 UNIT/L (ref 0–55)
AST SERPL-CCNC: 17 UNIT/L (ref 5–34)
BILIRUB SERPL-MCNC: 0.5 MG/DL (ref 0.2–1.2)
BILIRUBIN DIRECT+TOT PNL SERPL-MCNC: 0.2 MG/DL (ref 0–0.5)
BILIRUBIN DIRECT+TOT PNL SERPL-MCNC: 0.3 MG/DL (ref 0–0.8)
BUN SERPL-MCNC: 19.5 MG/DL (ref 9.8–20.1)
CALCIUM SERPL-MCNC: 9.3 MG/DL (ref 8.4–10.2)
CHLORIDE SERPL-SCNC: 105 MMOL/L (ref 98–107)
CHOLEST SERPL-MCNC: 170 MG/DL
CHOLEST/HDLC SERPL: 3 {RATIO} (ref 0–5)
CO2 SERPL-SCNC: 27 MMOL/L (ref 22–29)
CREAT SERPL-MCNC: 0.73 MG/DL (ref 0.57–1.11)
ERYTHROCYTE [DISTWIDTH] IN BLOOD BY AUTOMATED COUNT: 14 % (ref 11.5–17)
GLOBULIN SER-MCNC: 2.8 GM/DL (ref 2.4–3.5)
GLUCOSE SERPL-MCNC: 88 MG/DL (ref 74–100)
HCT VFR BLD AUTO: 41.1 % (ref 37–47)
HDLC SERPL-MCNC: 54 MG/DL (ref 40–60)
HGB BLD-MCNC: 13 GM/DL (ref 12–16)
LDLC SERPL CALC-MCNC: 101 MG/DL (ref 50–140)
MCH RBC QN AUTO: 29 PG (ref 27–31)
MCHC RBC AUTO-ENTMCNC: 31.6 GM/DL (ref 33–36)
MCV RBC AUTO: 91.7 FL (ref 80–94)
NRBC BLD AUTO-RTO: 0 % (ref 0–0.2)
PLATELET # BLD AUTO: 255 X10(3)/MCL (ref 130–400)
PMV BLD AUTO: 9.7 FL (ref 7.4–10.4)
POTASSIUM SERPL-SCNC: 4 MMOL/L (ref 3.5–5.1)
PROT SERPL-MCNC: 6.9 GM/DL (ref 6.4–8.3)
RBC # BLD AUTO: 4.48 X10(6)/MCL (ref 4.2–5.4)
SODIUM SERPL-SCNC: 139 MMOL/L (ref 136–145)
TRIGL SERPL-MCNC: 77 MG/DL (ref 0–150)
VLDLC SERPL CALC-MCNC: 15 MG/DL
WBC # SPEC AUTO: 9.5 X10(3)/MCL (ref 4.5–11.5)

## 2021-05-12 ENCOUNTER — PATIENT MESSAGE (OUTPATIENT)
Dept: RESEARCH | Facility: HOSPITAL | Age: 61
End: 2021-05-12

## 2021-05-22 LAB — SARS-COV-2 RNA RESP QL NAA+PROBE: NEGATIVE

## 2021-06-21 ENCOUNTER — TELEPHONE (OUTPATIENT)
Dept: RHEUMATOLOGY | Facility: CLINIC | Age: 61
End: 2021-06-21

## 2021-07-20 ENCOUNTER — TELEPHONE (OUTPATIENT)
Dept: RHEUMATOLOGY | Facility: CLINIC | Age: 61
End: 2021-07-20

## 2021-07-20 ENCOUNTER — PATIENT MESSAGE (OUTPATIENT)
Dept: RHEUMATOLOGY | Facility: CLINIC | Age: 61
End: 2021-07-20

## 2021-07-20 ENCOUNTER — HISTORICAL (OUTPATIENT)
Dept: ADMINISTRATIVE | Facility: HOSPITAL | Age: 61
End: 2021-07-20

## 2021-07-20 LAB — SARS-COV-2 RNA RESP QL NAA+PROBE: POSITIVE

## 2021-08-13 ENCOUNTER — TELEPHONE (OUTPATIENT)
Dept: RHEUMATOLOGY | Facility: CLINIC | Age: 61
End: 2021-08-13

## 2021-08-16 ENCOUNTER — OFFICE VISIT (OUTPATIENT)
Dept: RHEUMATOLOGY | Facility: CLINIC | Age: 61
End: 2021-08-16
Payer: MEDICARE

## 2021-08-16 ENCOUNTER — DOCUMENTATION ONLY (OUTPATIENT)
Dept: RHEUMATOLOGY | Facility: CLINIC | Age: 61
End: 2021-08-16

## 2021-08-16 DIAGNOSIS — M05.79 RHEUMATOID ARTHRITIS INVOLVING MULTIPLE SITES WITH POSITIVE RHEUMATOID FACTOR: Primary | ICD-10-CM

## 2021-08-16 DIAGNOSIS — D84.9 IMMUNOSUPPRESSED STATUS: ICD-10-CM

## 2021-08-16 PROCEDURE — 99214 OFFICE O/P EST MOD 30 MIN: CPT | Mod: 95,,, | Performed by: INTERNAL MEDICINE

## 2021-08-16 PROCEDURE — 99214 PR OFFICE/OUTPT VISIT, EST, LEVL IV, 30-39 MIN: ICD-10-PCS | Mod: 95,,, | Performed by: INTERNAL MEDICINE

## 2021-08-17 ENCOUNTER — PATIENT MESSAGE (OUTPATIENT)
Dept: RHEUMATOLOGY | Facility: CLINIC | Age: 61
End: 2021-08-17

## 2021-09-21 NOTE — TELEPHONE ENCOUNTER
----- Message from Lesly Altman sent at 1/29/2020 11:26 AM CST -----  Contact: pt  Type:  Patient Returning Call    Who Called:Katarzyna Shi  Who Left Message for Patient: Nurse Meehan  Does the patient know what this is regarding?: Return the call  Would the patient rather a call back or a response via [facility]? call  Best Call Back Number: [phone]   Additional Information:    
MTX approved Case ID [id number] Approved from 12/30/2019-1/29/2023 Patient aware  
Spoke with Mrs. Shi informed her that Erlinda submitted a prior authorization for Methotrexate today at 4:19. Mrs. Shi voiced understanding  
X Size Of Lesion In Cm (Optional): 0
Detail Level: Detailed

## 2022-03-11 ENCOUNTER — TELEPHONE (OUTPATIENT)
Dept: RHEUMATOLOGY | Facility: CLINIC | Age: 62
End: 2022-03-11
Payer: MEDICARE

## 2022-03-22 ENCOUNTER — TELEPHONE (OUTPATIENT)
Dept: RHEUMATOLOGY | Facility: CLINIC | Age: 62
End: 2022-03-22
Payer: MEDICARE

## 2022-03-23 ENCOUNTER — OFFICE VISIT (OUTPATIENT)
Dept: RHEUMATOLOGY | Facility: CLINIC | Age: 62
End: 2022-03-23
Payer: COMMERCIAL

## 2022-03-23 DIAGNOSIS — Z79.899 LONG TERM CURRENT USE OF IMMUNOSUPPRESSIVE DRUG: ICD-10-CM

## 2022-03-23 DIAGNOSIS — D84.9 IMMUNOSUPPRESSED STATUS: ICD-10-CM

## 2022-03-23 DIAGNOSIS — M06.9 FLARE OF RHEUMATOID ARTHRITIS: ICD-10-CM

## 2022-03-23 DIAGNOSIS — M05.79 RHEUMATOID ARTHRITIS INVOLVING MULTIPLE SITES WITH POSITIVE RHEUMATOID FACTOR: Primary | ICD-10-CM

## 2022-03-23 PROCEDURE — 99215 OFFICE O/P EST HI 40 MIN: CPT | Mod: 95,,, | Performed by: INTERNAL MEDICINE

## 2022-03-23 PROCEDURE — 99215 PR OFFICE/OUTPT VISIT, EST, LEVL V, 40-54 MIN: ICD-10-PCS | Mod: 95,,, | Performed by: INTERNAL MEDICINE

## 2022-03-23 RX ORDER — METHYLPREDNISOLONE 4 MG/1
TABLET ORAL
Qty: 21 TABLET | Refills: 0 | Status: SHIPPED | OUTPATIENT
Start: 2022-03-23 | End: 2022-06-20

## 2022-03-23 RX ORDER — UPADACITINIB 15 MG/1
15 TABLET, EXTENDED RELEASE ORAL DAILY
Qty: 30 TABLET | Refills: 11 | OUTPATIENT
Start: 2022-03-23 | End: 2022-03-30

## 2022-03-23 NOTE — Clinical Note
Please schedule follow-up with me in 3 months.  TB QuantiFERON at nearest [facility] as soon as possible.  no labs before next visit.  Thanks.

## 2022-03-23 NOTE — PROGRESS NOTES
RHEUMATOLOGY FOLLOW UP - TELE VISIT     The patient location is: [place]  The chief complaint leading to consultation is:  Flare-up of rheumatoid arthritis  Visit type: Virtual visit with synchronous audio and video  Total time spent with patient:  25 minutes  Each patient to whom he or she provides medical services by telemedicine is:  (1) informed of the relationship between the physician and patient and the respective role of any other health care provider with respect to management of the patient; and (2) notified that he or she may decline to receive medical services by telemedicine and may withdraw from such care at any time.    Chief complaints, HPI, ROS, EXAM, Assessment & Plans:-  Katarzyna A Joshua a 61 y.o. pleasant female seen today for worsening symptoms.  She has longstanding history of seropositive rheumatoid arthritis.  She has failed methotrexate, Enbrel, Humira, Actemra in the past.  She was on Orencia until couple years ago .  Orencia was discontinued after she had recurrent infections.  Since the last infection episode she went into remission and was doing well for the past 2 years without any flare until now.  Two months ago she started noticing worsening joint pain, stiffness and swelling in  her hands.  No flare-up in her other joints.  Rheumatological review of system negative otherwise.  Significant synovitis involving multiple PIP and MCP joints  1. Rheumatoid arthritis involving multiple sites with positive rheumatoid factor    2. Immunosuppressed status    3. Long term current use of immunosuppressive drug    4. Flare of rheumatoid arthritis      Problem List Items Addressed This Visit     Rheumatoid arthritis involving multiple sites with positive rheumatoid factor - Primary    Relevant Medications    upadacitinib (RINVOQ) 15 mg 24 hr tablet    methylPREDNISolone (MEDROL DOSEPACK) 4 mg tablet    Other Relevant Orders    X-Ray Hand 3 View Bilateral    Quantiferon Gold TB    Quantiferon Gold  TB    Long term current use of immunosuppressive drug    Immunosuppressed status      Other Visit Diagnoses     Flare of rheumatoid arthritis        Relevant Orders    Quantiferon Gold TB           Severe flare-up of seropositive rheumatoid arthritis.   Failed methotrexate, Enbrel, Humira, Orencia and Actemra in the past.     Start Rinvoq.   No history of thromboembolism.   Discussed in detail about all adverse effects of the medication including infection.   Medrol Dosepak p.r.n.   Compromised immune system secondary to autoimmune disease and use of immunosuppressive drugs. Monitor carefully for infections. Advised to get immediate medical care if any infection. Also advised strict adherence to age appropriate vaccinations and cancer screenings with PCP.   # Follow up in about 3 months (around 2022).      Past Medical History:   Diagnosis Date    Acid reflux     Anxiety     Arthritis     Atrial fibrillation     Clotting disorder     Emphysema of lung     Thyroid disease        Past Surgical History:   Procedure Laterality Date     SECTION          Social History     Tobacco Use    Smoking status: Never Smoker    Smokeless tobacco: Never Used       Family History   Problem Relation Age of Onset    Thyroid disease Mother     Alzheimer's disease Father        Review of patient's allergies indicates:  No Known Allergies    Medication List with Changes/Refills   New Medications    UPADACITINIB (RINVOQ) 15 MG 24 HR TABLET    Take 1 tablet (15 mg total) by mouth once daily.   Current Medications    ASPIRIN (ECOTRIN) 81 MG EC TABLET    Take 81 mg by mouth.    VERAPAMIL (VERELAN) 120 MG C24P    Take 120 mg by mouth once daily.   Changed and/or Refilled Medications    Modified Medication Previous Medication    METHYLPREDNISOLONE (MEDROL DOSEPACK) 4 MG TABLET methylPREDNISolone (MEDROL DOSEPACK) 4 mg tablet       use as directed    use as directed       Disclaimer: This note was prepared using  voice recognition system and is likely to have sound alike errors and is not proof read.  Please call me with any questions.

## 2022-03-24 ENCOUNTER — PATIENT MESSAGE (OUTPATIENT)
Dept: RHEUMATOLOGY | Facility: CLINIC | Age: 62
End: 2022-03-24
Payer: MEDICARE

## 2022-03-25 ENCOUNTER — TELEPHONE (OUTPATIENT)
Dept: RHEUMATOLOGY | Facility: CLINIC | Age: 62
End: 2022-03-25
Payer: MEDICARE

## 2022-03-25 NOTE — TELEPHONE ENCOUNTER
----- Message from Josselin Hidalgo sent at 3/25/2022  9:21 AM CDT -----  Pt would like to be called back regarding  xray on hands looking for a  place closer to home  Pt can be reached at  [phone]

## 2022-03-25 NOTE — TELEPHONE ENCOUNTER
Faxed xray orders to:    [facility] [phone]  Address:  [address],  [place] [address]  Phone: [phone]  Fax: [phone]

## 2022-03-28 ENCOUNTER — TELEPHONE (OUTPATIENT)
Dept: RHEUMATOLOGY | Facility: CLINIC | Age: 62
End: 2022-03-28
Payer: MEDICARE

## 2022-03-28 DIAGNOSIS — M06.9 FLARE OF RHEUMATOID ARTHRITIS: Primary | ICD-10-CM

## 2022-03-28 DIAGNOSIS — M05.79 RHEUMATOID ARTHRITIS INVOLVING MULTIPLE SITES WITH POSITIVE RHEUMATOID FACTOR: ICD-10-CM

## 2022-03-28 LAB
GAMMA INTERFERON BACKGROUND BLD IA-ACNC: 0.02 IU/ML
M TB IFN-G BLD-IMP: NEGATIVE
M TB IFN-G CD4+ BCKGRND COR BLD-ACNC: 0.01 IU/ML
M TB IFN-G CD4+CD8+ BCKGRND COR BLD-ACNC: <0 IU/ML
MITOGEN IGNF BCKGRD COR BLD-ACNC: >10 IU/ML

## 2022-03-29 ENCOUNTER — TELEPHONE (OUTPATIENT)
Dept: RHEUMATOLOGY | Facility: CLINIC | Age: 62
End: 2022-03-29
Payer: MEDICARE

## 2022-03-29 ENCOUNTER — SPECIALTY PHARMACY (OUTPATIENT)
Dept: PHARMACY | Facility: CLINIC | Age: 62
End: 2022-03-29
Payer: MEDICARE

## 2022-03-29 DIAGNOSIS — M05.79 RHEUMATOID ARTHRITIS INVOLVING MULTIPLE SITES WITH POSITIVE RHEUMATOID FACTOR: Primary | ICD-10-CM

## 2022-03-29 RX ORDER — OMEPRAZOLE 40 MG/1
40 CAPSULE, DELAYED RELEASE ORAL NIGHTLY PRN
COMMUNITY
End: 2022-06-20

## 2022-03-29 RX ORDER — LOTEPREDNOL ETABONATE 5 MG/ML
SUSPENSION/ DROPS OPHTHALMIC
COMMUNITY
Start: 2022-01-03 | End: 2022-06-20

## 2022-03-29 RX ORDER — FAMOTIDINE 40 MG/1
40 TABLET, FILM COATED ORAL DAILY PRN
COMMUNITY
Start: 2022-01-18 | End: 2022-06-20

## 2022-03-29 RX ORDER — OLMESARTAN MEDOXOMIL 20 MG/1
20 TABLET ORAL DAILY
COMMUNITY
Start: 2022-01-13

## 2022-03-29 NOTE — TELEPHONE ENCOUNTER
X-ray shows severe rheumatoid arthritis with multiple erosions.  Please start Rinvoq as soon as possible.  Please request patient to call specialty pharmacy today to check on approval.  Thanks.

## 2022-03-29 NOTE — TELEPHONE ENCOUNTER
Patient called [facility] and they said that it may take up to another week to get the approval.    She also read that there may be a drug interaction between Rinvoq and Verapamil. She has a call into her cardiologist also to see if it is okay to take.    Also concerned about blood clots with Rinvoq    Please call patient to discuss with her tomorrow

## 2022-03-30 PROBLEM — M06.9 FLARE OF RHEUMATOID ARTHRITIS: Status: ACTIVE | Noted: 2022-03-30

## 2022-03-30 LAB
PAP RECOMMENDATION EXT: NORMAL
PAP SMEAR: NORMAL

## 2022-03-30 RX ORDER — ABATACEPT 125 MG/ML
125 INJECTION, SOLUTION SUBCUTANEOUS
Qty: 4 EACH | Refills: 11 | Status: SHIPPED | OUTPATIENT
Start: 2022-03-30 | End: 2023-05-19 | Stop reason: SDUPTHER

## 2022-03-30 NOTE — TELEPHONE ENCOUNTER
Pt called [facility] and stated she will actually proceed with Orencia now which she was previously receiving through PAP program. PA on file until 6/5/2022. Pt provided her household income/size and verified her email address on file in order to work on PAP renewal.     Forwarding to financial assistance team to work on PAP renewal.

## 2022-03-31 NOTE — TELEPHONE ENCOUNTER
BI: Complete Orencia    RX     Estimated Copay:$832.60, pt is in the deductible phase and this rx will put pt in the coverage gap. Pt is LIS 04, partial help.  [facility] is in network   PA approved until 06/05/2022    Forwarding to FA for PAP renewal

## 2022-04-01 NOTE — TELEPHONE ENCOUNTER
Received pt portion via email however the documents are blurry. Emailed pt to re-send. Submitted to [facility] anyway to see if they would accept.

## 2022-04-06 NOTE — TELEPHONE ENCOUNTER
4/6-Pt was approved for [facility] PAP from 4/4/22-12/31/22. Informed pt to call [facility] at [phone] to schedule delivery.

## 2022-04-09 ENCOUNTER — HISTORICAL (OUTPATIENT)
Dept: ADMINISTRATIVE | Facility: HOSPITAL | Age: 62
End: 2022-04-09
Payer: MEDICARE

## 2022-04-29 VITALS
WEIGHT: 125 LBS | BODY MASS INDEX: 23 KG/M2 | DIASTOLIC BLOOD PRESSURE: 83 MMHG | HEIGHT: 62 IN | OXYGEN SATURATION: 99 % | SYSTOLIC BLOOD PRESSURE: 151 MMHG

## 2022-05-23 ENCOUNTER — PATIENT OUTREACH (OUTPATIENT)
Dept: ADMINISTRATIVE | Facility: HOSPITAL | Age: 62
End: 2022-05-23
Payer: MEDICARE

## 2022-05-23 NOTE — PROGRESS NOTES
Population Health Outreach.Records Received, hyper-linked into chart at this time. The following record(s)  below were uploaded for Health Maintenance .    10.28.2019-MAMMOGRAM

## 2022-06-08 ENCOUNTER — PATIENT MESSAGE (OUTPATIENT)
Dept: RHEUMATOLOGY | Facility: CLINIC | Age: 62
End: 2022-06-08
Payer: MEDICARE

## 2022-06-14 ENCOUNTER — PATIENT MESSAGE (OUTPATIENT)
Dept: RHEUMATOLOGY | Facility: CLINIC | Age: 62
End: 2022-06-14
Payer: MEDICARE

## 2022-06-14 ENCOUNTER — TELEPHONE (OUTPATIENT)
Dept: RHEUMATOLOGY | Facility: CLINIC | Age: 62
End: 2022-06-14
Payer: MEDICARE

## 2022-06-17 ENCOUNTER — TELEPHONE (OUTPATIENT)
Dept: RHEUMATOLOGY | Facility: CLINIC | Age: 62
End: 2022-06-17
Payer: MEDICARE

## 2022-06-20 ENCOUNTER — OFFICE VISIT (OUTPATIENT)
Dept: RHEUMATOLOGY | Facility: CLINIC | Age: 62
End: 2022-06-20
Payer: COMMERCIAL

## 2022-06-20 ENCOUNTER — PATIENT MESSAGE (OUTPATIENT)
Dept: RHEUMATOLOGY | Facility: CLINIC | Age: 62
End: 2022-06-20

## 2022-06-20 DIAGNOSIS — M05.79 RHEUMATOID ARTHRITIS INVOLVING MULTIPLE SITES WITH POSITIVE RHEUMATOID FACTOR: Primary | ICD-10-CM

## 2022-06-20 DIAGNOSIS — D84.9 IMMUNOSUPPRESSED STATUS: ICD-10-CM

## 2022-06-20 DIAGNOSIS — Z79.899 LONG TERM CURRENT USE OF IMMUNOSUPPRESSIVE DRUG: ICD-10-CM

## 2022-06-20 PROCEDURE — 99214 OFFICE O/P EST MOD 30 MIN: CPT | Mod: 95,,, | Performed by: INTERNAL MEDICINE

## 2022-06-20 PROCEDURE — 99214 PR OFFICE/OUTPT VISIT, EST, LEVL IV, 30-39 MIN: ICD-10-PCS | Mod: 95,,, | Performed by: INTERNAL MEDICINE

## 2022-06-20 NOTE — PROGRESS NOTES
RHEUMATOLOGY FOLLOW UP - TELE VISIT     The patient location is: [place]  The chief complaint leading to consultation is:  Rheumatoid arthritis  Visit type: Virtual visit with synchronous audio and video  Total time spent with patient:  10 minutes  Each patient to whom he or she provides medical services by telemedicine is:  (1) informed of the relationship between the physician and patient and the respective role of any other health care provider with respect to management of the patient; and (2) notified that he or she may decline to receive medical services by telemedicine and may withdraw from such care at any time.    Chief complaints, HPI, ROS, EXAM, Assessment & Plans:-  Katarzyna MAI Jose Guadalupenaveen a 61 y.o. pleasant female seen today for follow-up for rheumatoid arthritis- seropositive nonerosive rheumatoid arthritis.  Had a severe flare back in March and was restarted on Orencia.  She reports significant improvement in her symptoms since starting Orencia.  More than 30% improvement in joint pain, swelling and stiffness.  Still has morning stiffness lasting up to 30 minutes.  Feeling better overall.  Exercising more.  Rheumatological review of system negative otherwise.  Exam shows improvement of synovitis and fist formation.  1. Rheumatoid arthritis involving multiple sites with positive rheumatoid factor    2. Immunosuppressed status    3. Long term current use of immunosuppressive drug      Problem List Items Addressed This Visit     Rheumatoid arthritis involving multiple sites with positive rheumatoid factor - Primary    Long term current use of immunosuppressive drug    Immunosuppressed status           Improving seropositive nonerosive rheumatoid arthritis on Orencia.  Failed methotrexate, Enbrel, Humira and Actemra in the past.   Continue Orencia weekly.   Compromised immune system secondary to autoimmune disease and use of immunosuppressive drugs. Monitor carefully for infections. Advised to get immediate  medical care if any infection. Also advised strict adherence to age appropriate vaccinations and cancer screenings with PCP.   Hold Orencia if any infection   Safety labs before next visit   # No follow-ups on file.      Past Medical History:   Diagnosis Date    Acid reflux     Anxiety     Arthritis     Atrial fibrillation     Clotting disorder     Emphysema of lung     Lung mass     Thyroid disease        Past Surgical History:   Procedure Laterality Date     SECTION          Social History     Tobacco Use    Smoking status: Never Smoker    Smokeless tobacco: Never Used       Family History   Problem Relation Age of Onset    Thyroid disease Mother     Alzheimer's disease Father        Review of patient's allergies indicates:   Allergen Reactions    Sulfa (sulfonamide antibiotics)        Medication List with Changes/Refills   Current Medications    ABATACEPT (ORENCIA CLICKJECT) 125 MG/ML ATIN    Inject 125 mg into the skin every 7 days.    ASPIRIN (ECOTRIN) 81 MG EC TABLET    Take 81 mg by mouth.    OLMESARTAN (BENICAR) 20 MG TABLET    Take 20 mg by mouth once daily.    UNABLE TO FIND    Estradiol/testosterone topical cream    VERAPAMIL (VERELAN) 120 MG C24P    Take 120 mg by mouth once daily.   Discontinued Medications    FAMOTIDINE (PEPCID) 40 MG TABLET    Take 40 mg by mouth daily as needed. am    LOTEPREDNOL (LOTEMAX) 0.5 % OPHTHALMIC SUSPENSION    INSTILL 1 DROP IN THE AFFECTED EYE(S) 3 TIMES DAILY    METHYLPREDNISOLONE (MEDROL DOSEPACK) 4 MG TABLET    use as directed    OMEPRAZOLE (PRILOSEC) 40 MG CAPSULE    Take 40 mg by mouth nightly as needed.       Disclaimer: This note was prepared using voice recognition system and is likely to have sound alike errors and is not proof read.  Please call me with any questions.

## 2022-07-25 ENCOUNTER — HOSPITAL ENCOUNTER (OUTPATIENT)
Dept: RADIOLOGY | Facility: HOSPITAL | Age: 62
Discharge: HOME | End: 2022-07-25
Attending: INTERNAL MEDICINE
Payer: MEDICARE

## 2022-07-25 DIAGNOSIS — R91.8 LUNG MASS: ICD-10-CM

## 2022-07-25 PROCEDURE — 71250 CT THORAX DX C-: CPT | Mod: TC

## 2022-09-16 ENCOUNTER — HISTORICAL (OUTPATIENT)
Dept: ADMINISTRATIVE | Facility: HOSPITAL | Age: 62
End: 2022-09-16
Payer: MEDICARE

## 2022-09-17 ENCOUNTER — HISTORICAL (OUTPATIENT)
Dept: ADMINISTRATIVE | Facility: HOSPITAL | Age: 62
End: 2022-09-17
Payer: MEDICARE

## 2022-10-03 ENCOUNTER — OFFICE VISIT (OUTPATIENT)
Dept: INTERNAL MEDICINE | Facility: CLINIC | Age: 62
End: 2022-10-03
Payer: MEDICARE

## 2022-10-03 VITALS
WEIGHT: 130.81 LBS | DIASTOLIC BLOOD PRESSURE: 74 MMHG | SYSTOLIC BLOOD PRESSURE: 120 MMHG | TEMPERATURE: 98 F | OXYGEN SATURATION: 97 % | HEART RATE: 90 BPM | BODY MASS INDEX: 24.07 KG/M2 | HEIGHT: 62 IN | RESPIRATION RATE: 16 BRPM

## 2022-10-03 DIAGNOSIS — A08.4 VIRAL GASTROENTERITIS: ICD-10-CM

## 2022-10-03 DIAGNOSIS — Z12.31 SCREENING MAMMOGRAM FOR BREAST CANCER: ICD-10-CM

## 2022-10-03 DIAGNOSIS — R10.9 ABDOMINAL PAIN, UNSPECIFIED ABDOMINAL LOCATION: ICD-10-CM

## 2022-10-03 DIAGNOSIS — M25.551 RIGHT HIP PAIN: Primary | ICD-10-CM

## 2022-10-03 PROCEDURE — 3074F PR MOST RECENT SYSTOLIC BLOOD PRESSURE < 130 MM HG: ICD-10-PCS | Mod: CPTII,,, | Performed by: INTERNAL MEDICINE

## 2022-10-03 PROCEDURE — 1160F RVW MEDS BY RX/DR IN RCRD: CPT | Mod: CPTII,,, | Performed by: INTERNAL MEDICINE

## 2022-10-03 PROCEDURE — 99214 PR OFFICE/OUTPT VISIT, EST, LEVL IV, 30-39 MIN: ICD-10-PCS | Mod: ,,, | Performed by: INTERNAL MEDICINE

## 2022-10-03 PROCEDURE — 1160F PR REVIEW ALL MEDS BY PRESCRIBER/CLIN PHARMACIST DOCUMENTED: ICD-10-PCS | Mod: CPTII,,, | Performed by: INTERNAL MEDICINE

## 2022-10-03 PROCEDURE — 3078F DIAST BP <80 MM HG: CPT | Mod: CPTII,,, | Performed by: INTERNAL MEDICINE

## 2022-10-03 PROCEDURE — 4010F ACE/ARB THERAPY RXD/TAKEN: CPT | Mod: CPTII,,, | Performed by: INTERNAL MEDICINE

## 2022-10-03 PROCEDURE — 1159F PR MEDICATION LIST DOCUMENTED IN MEDICAL RECORD: ICD-10-PCS | Mod: CPTII,,, | Performed by: INTERNAL MEDICINE

## 2022-10-03 PROCEDURE — 3008F PR BODY MASS INDEX (BMI) DOCUMENTED: ICD-10-PCS | Mod: CPTII,,, | Performed by: INTERNAL MEDICINE

## 2022-10-03 PROCEDURE — 99214 OFFICE O/P EST MOD 30 MIN: CPT | Mod: ,,, | Performed by: INTERNAL MEDICINE

## 2022-10-03 PROCEDURE — 4010F PR ACE/ARB THEARPY RXD/TAKEN: ICD-10-PCS | Mod: CPTII,,, | Performed by: INTERNAL MEDICINE

## 2022-10-03 PROCEDURE — 3008F BODY MASS INDEX DOCD: CPT | Mod: CPTII,,, | Performed by: INTERNAL MEDICINE

## 2022-10-03 PROCEDURE — 1159F MED LIST DOCD IN RCRD: CPT | Mod: CPTII,,, | Performed by: INTERNAL MEDICINE

## 2022-10-03 PROCEDURE — 3078F PR MOST RECENT DIASTOLIC BLOOD PRESSURE < 80 MM HG: ICD-10-PCS | Mod: CPTII,,, | Performed by: INTERNAL MEDICINE

## 2022-10-03 PROCEDURE — 3074F SYST BP LT 130 MM HG: CPT | Mod: CPTII,,, | Performed by: INTERNAL MEDICINE

## 2022-10-03 RX ORDER — ONDANSETRON 4 MG/1
4 TABLET, ORALLY DISINTEGRATING ORAL EVERY 8 HOURS PRN
Qty: 30 TABLET | Refills: 0 | Status: SHIPPED | OUTPATIENT
Start: 2022-10-03 | End: 2023-06-20

## 2022-10-03 NOTE — ASSESSMENT & PLAN NOTE
X-ray right hip ordered.  Patient with history of chronic prednisone use, need to rule out avascular necrosis, primary hip arthritis if negative will pursue pelvic and hip MRI

## 2022-10-03 NOTE — PROGRESS NOTES
Subjective:      Patient ID: Katarzyna Shi is a 62 y.o. female.    Chief Complaint: Hip Pain (Right hip pain for months)      HPI:  Nausea and diarrhea x 1 day; nothing to eat x 2 days  Not worse  Had COVID in July  No fever, body aches, no headaches or lethargy  No one else sick at home  Right hip pain, doing a walk/ run program; gnawing pain for the past several years  Has history of RA; has been on prednisone for years  Pain radiates to the knee, not to the foot    Has chronic abdominal pain; only on the right side. Still with female organs in place.   Has never had a c scope    Past Medical History:  Past Medical History:   Diagnosis Date    Acid reflux     Anxiety     Arthritis     Atrial fibrillation     Clotting disorder     Emphysema of lung     Lung mass     Thyroid disease      Past Surgical History:   Procedure Laterality Date     SECTION       Review of patient's allergies indicates:   Allergen Reactions    Sulfa (sulfonamide antibiotics)      Other reaction(s): Interaction with others - finding  interacts with methotrexate       Current Outpatient Medications on File Prior to Visit   Medication Sig Dispense Refill    abatacept (ORENCIA CLICKJECT) 125 mg/mL AtIn Inject 125 mg into the skin every 7 days. 4 each 11    aspirin (ECOTRIN) 81 MG EC tablet Take 81 mg by mouth.      olmesartan (BENICAR) 20 MG tablet Take 20 mg by mouth once daily.      verapamil (VERELAN) 120 MG C24P Take 120 mg by mouth once daily.  6    UNABLE TO FIND Estradiol/testosterone topical cream       No current facility-administered medications on file prior to visit.     Social History     Socioeconomic History    Marital status:    Tobacco Use    Smoking status: Never    Smokeless tobacco: Never   Substance and Sexual Activity    Alcohol use: Never    Drug use: Never    Sexual activity: Not Currently     Partners: Male     Family History   Problem Relation Age of Onset    Thyroid disease Mother     Arthritis  "Mother     Heart disease Mother     Alzheimer's disease Father     Heart disease Father        Review of Systems  A comprehensive review of systems was performed and was negative with exception of what is documented above.     Objective:   /74 (BP Location: Right arm, Patient Position: Sitting, BP Method: Medium (Manual))   Pulse 90   Temp 97.7 °F (36.5 °C) (Temporal)   Resp 16   Ht 5' 2" (1.575 m)   Wt 59.3 kg (130 lb 12.8 oz)   SpO2 97%   BMI 23.92 kg/m²   Physical Exam  General : Alert and oriented, No acute distress, afebrile.  Eye : PERRLA. EOMI. Normal conjunctiva, Sclerae are nonicteric. No conjunctival injection, no pallor.  HEENT : Normocephalic/ atraumatic, Normal hearing, Oral mucosa is moist.  Respiratory : Respirations are non-labored and clear to auscultation bilaterally. Symmetrical air entry bilaterally, no crackles, no wheezes, no rhonchi. No cyanosis, no clubbing.  Cardiovascular : Normal rate, Regular rhythm. No murmurs, rubs, or gallops. Pulses are 2+ throughout. No JVD. No Edema.  Gastrointestinal : Soft, nontender, non-distended, bowel sounds are present in all quadrants, no organomegaly, no guarding, no rebound.  Musculoskeletal :  Straight leg raise negative, external rotation of the right hip causes pain in the posterior pelvis and into the groin.  Integumentary : Warm, moist, intact.  Neurologic : Alert, Oriented.  Psychiatric : Cooperative, Appropriate mood & affect.   Assessment/ Plan:   1. Right hip pain  Assessment & Plan:  X-ray right hip ordered.  Patient with history of chronic prednisone use, need to rule out avascular necrosis, primary hip arthritis if negative will pursue pelvic and hip MRI    Orders:  -     X-Ray Hip 2 or 3 views Right (with Pelvis when performed)    2. Abdominal pain, unspecified abdominal location  Assessment & Plan:  Has been present for years  Ultrasound ordered, referral for colonoscopy    Orders:  -     US Abdomen Complete  -     Ambulatory " referral/consult to Gastroenterology    3. Viral gastroenteritis  Assessment & Plan:  Supportive care, Rx for Zofran sent to pharmacy patient to call if any worsening of symptoms      4. Screening mammogram for breast cancer  -     Mammo Digital Screening Bilat    Other orders  -     ondansetron (ZOFRAN-ODT) 4 MG TbDL           Follow up if symptoms worsen or fail to improve.

## 2022-10-04 ENCOUNTER — TELEPHONE (OUTPATIENT)
Dept: INTERNAL MEDICINE | Facility: CLINIC | Age: 62
End: 2022-10-04
Payer: MEDICARE

## 2022-10-04 RX ORDER — DIPHENOXYLATE HYDROCHLORIDE AND ATROPINE SULFATE 2.5; .025 MG/1; MG/1
1 TABLET ORAL 4 TIMES DAILY PRN
Qty: 30 TABLET | Refills: 0 | Status: SHIPPED | OUTPATIENT
Start: 2022-10-04 | End: 2022-10-14

## 2022-10-04 NOTE — TELEPHONE ENCOUNTER
----- Message from Karina Lacey sent at 10/4/2022  8:33 AM CDT -----  Pt called stated her diarrhea was very bad last night,. Pt states she went to the bathroom every 15 mins last night. Pt stated last time she had this virus she took Lomotil and it really  working for her. Please advise?

## 2022-10-12 ENCOUNTER — TELEPHONE (OUTPATIENT)
Dept: INTERNAL MEDICINE | Facility: CLINIC | Age: 62
End: 2022-10-12
Payer: MEDICARE

## 2022-10-12 DIAGNOSIS — R93.7 ABNORMAL MRI, LUMBAR SPINE: Primary | ICD-10-CM

## 2022-10-12 DIAGNOSIS — R10.9 ABDOMINAL PAIN, UNSPECIFIED ABDOMINAL LOCATION: Primary | ICD-10-CM

## 2022-10-12 NOTE — TELEPHONE ENCOUNTER
----- Message from Eulalio Victoria MD sent at 10/12/2022  8:49 AM CDT -----  MRI of the lumbar spine reviewed patient with mild to moderate evidence of discogenic disease. there is a disc bulge at L3-L4 which is causing some mild canal stenosis.  Recommend follow-up with Neurosurgery or spine orthopedics for evaluation.  If patient has a preference that is fine otherwise we consider referral to Dr. Rolando Dunn

## 2022-10-14 ENCOUNTER — TELEPHONE (OUTPATIENT)
Dept: NEUROSURGERY | Facility: CLINIC | Age: 62
End: 2022-10-14
Payer: MEDICARE

## 2022-10-14 ENCOUNTER — TELEPHONE (OUTPATIENT)
Dept: INTERNAL MEDICINE | Facility: CLINIC | Age: 62
End: 2022-10-14
Payer: MEDICARE

## 2022-10-14 DIAGNOSIS — R10.2 PELVIC PAIN: Primary | ICD-10-CM

## 2022-10-14 NOTE — TELEPHONE ENCOUNTER
Spoke to pt. Pt Verbally confirmed understanding.  She stated that she would like to do the Endovaginal US.   US ordered and pt is going to schedule

## 2022-10-14 NOTE — TELEPHONE ENCOUNTER
----- Message from Eulalio Victoria MD sent at 10/13/2022  4:46 PM CDT -----  Pelvic ultrasound reviewed, uterus is normal in size, no cervical pathology identified.  The ovaries were unable to be visualized on transabdominal imaging, patient declined endovaginal ultrasound.

## 2022-10-18 ENCOUNTER — TELEPHONE (OUTPATIENT)
Dept: INTERNAL MEDICINE | Facility: CLINIC | Age: 62
End: 2022-10-18
Payer: MEDICARE

## 2022-10-18 NOTE — TELEPHONE ENCOUNTER
----- Message from Eulalio Victoria MD sent at 10/17/2022  4:00 PM CDT -----  Transvaginal ultrasound reviewed patient with small uterine fibroid measuring 1 cm, endometrial complex is of normal thickness.  Right ovary is of normal size and appearance, left ovary unable to be visualized.

## 2022-10-18 NOTE — TELEPHONE ENCOUNTER
Spoke to pt. Pt Verbally confirmed understanding.  Pt is going to go to Dr. Jimmy Thompson's office to schedule colonoscopy.

## 2022-10-20 NOTE — TELEPHONE ENCOUNTER
After further review into patient's referral; it looks like it was cancelled due to ins restrictions automatically; Dr. Mcfarland doesn't accept Medicare under 65.

## 2022-11-11 ENCOUNTER — TELEPHONE (OUTPATIENT)
Dept: RHEUMATOLOGY | Facility: CLINIC | Age: 62
End: 2022-11-11
Payer: MEDICARE

## 2022-11-17 ENCOUNTER — SPECIALTY PHARMACY (OUTPATIENT)
Dept: PHARMACY | Facility: CLINIC | Age: 62
End: 2022-11-17

## 2022-11-17 NOTE — TELEPHONE ENCOUNTER
Outgoing call to pt regarding Orencia/[facility] enrollment for 2023,pt declined called, and unable to leave a message.

## 2022-11-18 ENCOUNTER — OFFICE VISIT (OUTPATIENT)
Dept: UROGYNECOLOGY | Facility: CLINIC | Age: 62
End: 2022-11-18
Payer: MEDICARE

## 2022-11-18 VITALS
TEMPERATURE: 98 F | SYSTOLIC BLOOD PRESSURE: 130 MMHG | WEIGHT: 131.63 LBS | RESPIRATION RATE: 20 BRPM | HEART RATE: 84 BPM | DIASTOLIC BLOOD PRESSURE: 82 MMHG | HEIGHT: 62 IN | BODY MASS INDEX: 24.22 KG/M2

## 2022-11-18 DIAGNOSIS — N30.10 IC (INTERSTITIAL CYSTITIS): Primary | ICD-10-CM

## 2022-11-18 LAB
APPEARANCE UR: CLEAR
BACTERIA #/AREA URNS AUTO: NORMAL /HPF
BILIRUB UR QL STRIP.AUTO: NEGATIVE MG/DL
COLOR UR AUTO: YELLOW
GLUCOSE UR QL STRIP.AUTO: NEGATIVE MG/DL
KETONES UR QL STRIP.AUTO: NEGATIVE MG/DL
LEUKOCYTE ESTERASE UR QL STRIP.AUTO: NEGATIVE UNIT/L
NITRITE UR QL STRIP.AUTO: NEGATIVE
PH UR STRIP.AUTO: 7.5 [PH]
PROT UR QL STRIP.AUTO: NEGATIVE MG/DL
RBC #/AREA URNS AUTO: <5 /HPF
RBC UR QL AUTO: NEGATIVE UNIT/L
SP GR UR STRIP.AUTO: 1.01 (ref 1–1.03)
SQUAMOUS #/AREA URNS AUTO: <5 /HPF
UROBILINOGEN UR STRIP-ACNC: 0.2 MG/DL
WBC #/AREA URNS AUTO: <5 /HPF

## 2022-11-18 PROCEDURE — 1159F PR MEDICATION LIST DOCUMENTED IN MEDICAL RECORD: ICD-10-PCS | Mod: CPTII,S$GLB,, | Performed by: OBSTETRICS & GYNECOLOGY

## 2022-11-18 PROCEDURE — 4010F PR ACE/ARB THEARPY RXD/TAKEN: ICD-10-PCS | Mod: CPTII,S$GLB,, | Performed by: OBSTETRICS & GYNECOLOGY

## 2022-11-18 PROCEDURE — 3075F SYST BP GE 130 - 139MM HG: CPT | Mod: CPTII,S$GLB,, | Performed by: OBSTETRICS & GYNECOLOGY

## 2022-11-18 PROCEDURE — 4010F ACE/ARB THERAPY RXD/TAKEN: CPT | Mod: CPTII,S$GLB,, | Performed by: OBSTETRICS & GYNECOLOGY

## 2022-11-18 PROCEDURE — 3008F PR BODY MASS INDEX (BMI) DOCUMENTED: ICD-10-PCS | Mod: CPTII,S$GLB,, | Performed by: OBSTETRICS & GYNECOLOGY

## 2022-11-18 PROCEDURE — 3079F DIAST BP 80-89 MM HG: CPT | Mod: CPTII,S$GLB,, | Performed by: OBSTETRICS & GYNECOLOGY

## 2022-11-18 PROCEDURE — 99205 PR OFFICE/OUTPT VISIT, NEW, LEVL V, 60-74 MIN: ICD-10-PCS | Mod: S$GLB,,, | Performed by: OBSTETRICS & GYNECOLOGY

## 2022-11-18 PROCEDURE — 1159F MED LIST DOCD IN RCRD: CPT | Mod: CPTII,S$GLB,, | Performed by: OBSTETRICS & GYNECOLOGY

## 2022-11-18 PROCEDURE — 81001 URINALYSIS AUTO W/SCOPE: CPT | Performed by: OBSTETRICS & GYNECOLOGY

## 2022-11-18 PROCEDURE — 3079F PR MOST RECENT DIASTOLIC BLOOD PRESSURE 80-89 MM HG: ICD-10-PCS | Mod: CPTII,S$GLB,, | Performed by: OBSTETRICS & GYNECOLOGY

## 2022-11-18 PROCEDURE — 3075F PR MOST RECENT SYSTOLIC BLOOD PRESS GE 130-139MM HG: ICD-10-PCS | Mod: CPTII,S$GLB,, | Performed by: OBSTETRICS & GYNECOLOGY

## 2022-11-18 PROCEDURE — 99205 OFFICE O/P NEW HI 60 MIN: CPT | Mod: S$GLB,,, | Performed by: OBSTETRICS & GYNECOLOGY

## 2022-11-18 PROCEDURE — 3008F BODY MASS INDEX DOCD: CPT | Mod: CPTII,S$GLB,, | Performed by: OBSTETRICS & GYNECOLOGY

## 2022-11-18 RX ORDER — PHENAZOPYRIDINE HYDROCHLORIDE 200 MG/1
200 TABLET, FILM COATED ORAL 3 TIMES DAILY PRN
Qty: 90 TABLET | Refills: 2 | Status: SHIPPED | OUTPATIENT
Start: 2022-11-18 | End: 2022-12-18

## 2022-11-18 RX ORDER — ESTRADIOL 0.1 MG/G
1 CREAM VAGINAL
COMMUNITY
Start: 2022-10-20

## 2022-11-18 NOTE — PATIENT INSTRUCTIONS
1) Pyridium 1 pills 3 times per day.  2) Diet changes - list of foods and Prelief  3) Increase vaginal estrogen to 3x per week  4) Cystoscopy    We reviewed the pathophysiology of interstitial/painful bladder syndrome. We also discussed the risk and benefits of additional work up and strategies for management. We discussed management options like short term use of Pyridium, use of prelief, pelvic floor physical therapy, hydroxyzine, avoidance of bladder irritants, limitation of environmental stressors, amitriptyline, gabapentin, Cysto with hydrodistension and bladder instillations.

## 2022-11-18 NOTE — PROGRESS NOTES
Pelvic Medicine and Reconstructive Surgery Consult Note    CHIEF COMPLAINT:  Katarzyna Shi is a 62 y.o. female who is here for constant pressure on the bladder and lower abdominal pain    HISTORY OF PRESENT ILLNESS:   Katarzyna Shi is a 62 y.o.  post menopausal female with constant pressure on the bladder  Patient reports first noticing symptoms a while ago.    Her symptoms consist of pelvic pressure and pain, especially after sex.  She also notes she has had frequency UTIs in the past, but the cultures are usually negative.    Prior cystoscopy? No  Previous treatments? None    Patient states her bladder sometimes feels better after she urinates.  Her lower abdomen feels worse in the evening and as the day goes on.    She drinks a lot of water at night.    She took Cipro 500 mg 1 pill in the AM yesterday.  She noted previously that Pyridium 2 pills daily work for her last UTI.    Recently she has noticed the right side of her abdomen feels worse than the left.  She also notes pain after sex.  It feels like a constant pressure on the bladder.    She feels like she gets a lot of UTIs but the culture is usually negative.    Her Internist made an appointment for GI for her.  She is to have a Colonoscopy, but also to discuss her pelvic pain.  She has 3-4 BMs per week     -Problems with pelvic pressure, vaginal bulge, or vaginal discomfort: Yes, pelvic pressure  -Vaginal bleeding No  -Vaginal discharge No  -Dysuria Yes  -Hematuria no  -Urinary frequency yes  -Urgency yes  -Incontinence Yes, sometimes with urgency.  She has about 5 min to make it to the bathroom.  Leak is usually small.  Does not wear a pad for leakage    -Problems with abdominal or pelvic pain Yes.    Bladder symptoms  Dysuria:with UTI  Hematuria: No  Leakage of urine: yes, sometimes with cough  Voids during the day: q1-2h  Voids at nighttime: 2-3  Sensation of incomplete emptying: No  Glasses/ounces of water in 1 day: 2-4 glasses  Cups of  coffee/tea/soda in 1 day: 1 cup of coffee per day  > 3 urinary infections in the past year: No.  Felt like she has had UTIs, but culture is usually negative.  -UTI sx: constant urge to go and sometimes burning  Sensation of Bulge in vagina: No  Sexually active: Yes  Pain with sex: Yes  Vaginal dryness: No  Bowel movements in 1 week: 3-4  Constipation/straining: No    Current therapies: None    Prior treatment intervention included:  Behavorial modification/limitation of bladder irritants:No  Uribel/Pyridium: Yes, occasionally Pyridium.  She states when she takes it for UTI sx it works  Pelvic floor PT:No  Amytriptyline: No  Hydroxyzine:No  Gabapentin:No  Elmiron:No  Bladder instillation: No  Cysto with hydrodistension:No    Date of last flare: Just took Cipro, one pill that she had leftover yesterday morning    Prior eval: none  Last cysto: none    PUF questionnaire:    Symptom score: 17  Bother score: 11  Total:  28    OB History    Para Term  AB Living   3 3 3 0 0 3   SAB IAB Ectopic Multiple Live Births   0 0 0 0 3     Review of patient's allergies indicates:   Allergen Reactions    Sulfa (sulfonamide antibiotics)      Other reaction(s): Interaction with others - finding  interacts with methotrexate          Current Outpatient Medications:     abatacept (ORENCIA CLICKJECT) 125 mg/mL AtIn, Inject 125 mg into the skin every 7 days., Disp: 4 each, Rfl: 11    aspirin (ECOTRIN) 81 MG EC tablet, Take 81 mg by mouth., Disp: , Rfl:     estradioL (ESTRACE) 0.01 % (0.1 mg/gram) vaginal cream, Place 1 g vaginally twice a week., Disp: , Rfl:     olmesartan (BENICAR) 20 MG tablet, Take 20 mg by mouth once daily., Disp: , Rfl:     verapamil (VERELAN) 120 MG C24P, Take 120 mg by mouth once daily., Disp: , Rfl: 6    ondansetron (ZOFRAN-ODT) 4 MG TbDL, Take 1 tablet (4 mg total) by mouth every 8 (eight) hours as needed (nausea). (Patient not taking: Reported on 2022), Disp: 30 tablet, Rfl: 0    Past Medical  "History:   Diagnosis Date    Acid reflux     Anxiety     Arthritis     Atrial fibrillation     Clotting disorder     Emphysema of lung     Lung mass     Thyroid disease      Past Surgical History:   Procedure Laterality Date     SECTION N/A 1982    Vertical skin incision, unknown uterine scar     SECTION N/A 1985    Vertical skin incision, unknown uterine scar     SECTION N/A 1988    Vertical skin incision, unknown uterine scar       Family History   Problem Relation Age of Onset    Thyroid disease Mother     Arthritis Mother     Heart disease Mother     Alzheimer's disease Father     Heart disease Father     Heart disease Sister         Social History     Tobacco Use    Smoking status: Never     Passive exposure: Never    Smokeless tobacco: Never   Substance Use Topics    Alcohol use: Yes     Alcohol/week: 1.0 standard drink     Types: 1 Glasses of wine per week     Comment: usually with dinner    Drug use: Never        Review of Systems   Psychological ROS: negative  Eyes: Negative  ENT ROS: Negative  Neuro ROS: Negative  Respiratory ROS: Negative  Gastrointestinal ROS: Nausea or vomiting, Abdominal pain or cramps, and Frequent need for antacids - sometimes  Cardiovascular ROS: Palpitations - in the past, on medication for it now  Genitourinary ROS: Burning or pain with urination, Frequent urination, Leaking of urine, and Waking to urinate  Integumentary ROS: Negative  Musculoskeletal ROS: Joint pain/stiffness/swelling and Muscle cramps/pains    Physical Exam:   /82 (BP Location: Right arm, Patient Position: Sitting, BP Method: Medium (Automatic))   Pulse 84   Temp 98.4 °F (36.9 °C) (Oral)   Resp 20   Ht 5' 2" (1.575 m)   Wt 59.7 kg (131 lb 9.6 oz)   BMI 24.07 kg/m²   General: No acute distress   Skin: no lesions  Neck: supple  CV: rrr, no mrg  Pulm: CTA bilaterally  Musculoskeletal: normal lower extremity strength  Sensation to light touch in the lower extremities is " normal   Extremities: well perfused with normal pulses in the distal extremities, no peripheral edema noted  Abdominal exam: soft non tender, no palpable masses, midline scar, well-healed (from previous C/S x 3) - discomfort located to the left and right (R>L) of the bottom portion of the scar.  No suprapubic discomfort with deep palpation  Exam Chaperoned by: Kinza Lamas LPN  External genitalia: normal female genitalia, no lesions, normal female hair distribution, no clitoral enlargement, nontender, no scars  Sensation S2-S4 is present  The perineal reflexes are present  Vagina: normal vagina, no discharge, exudate, lesion, or erythema  Cervix: normal, no tenderness with motion  Bimanual exam: uterus 6 weeks, no palpable masses, non-tender exam  Pelvic muscle strength 2/5  Recto-vaginal exam: no palpable masses no enterocele   Urethra: no prolapse, no caruncle   Stress test: negative   Urethral hypermobility: none  Levators are supple.  Non-tender bladder to palpation  Post void residual volume: 18 mL    POP-Q Exam: pelvic organ well supported, no prolapse     IMAGING:    10/17/2022 US Transvaginal:  FINDINGS:  The uterus is atrophic with a normal contour and position, measuring 5.7 x 4.8 x 2.6 cm.  The uterine myometrium is homogeneous with a small coarse peripheral calcification measuring 5 mm in greatest dimension and with a small hypoechoic fibroid suggested at the fundus measuring 1.1 x 0.8 x 1.1 cm.  The endometrial complex is not well visualized, roughly 5 mm in greatest thickness.  The right ovary is normal in size and appearance, measuring 2.3 x 1.1 x 2 cm.  The left ovary was unable to be visualized.  There is no pelvic free fluid or extra-adnexal pelvic mass.     Impression:     Chronic findings as above with no significant abnormality at the right ovary and with the left ovary again unable to be visualized on endovaginal imaging.    10/13/2022 US Abdomen:  FINDINGS:  The uterus is normal in size,  contour, and position, measuring 6.6 x 4.1 x 2.8 cm.  The uterine myometrium is homogeneous and normal in appearance with no uterine lesion identified.  The endometrial complex is homogeneous and measures just under 5 mm in greatest thickness.  No cervical pathology is identified.  The bilateral ovaries were unable to be visualized on transabdominal imaging.  The patient declined endovaginal ultrasound.  There is no pelvic free fluid or mass identified.     Impression:     No pelvic pathology is identified.     The ovaries were unable to be visualized on transabdominal imaging and the patient declined endovaginal ultrasound.       10/10/2022 MRI Lumbar Spine:  FINDINGS:  2 mm of anterolisthesis is observed at L3-L4.  Lumbar spine alignment is anatomic, with a normal lumbar lordotic curvature.  Vertebral body heights are maintained.  The discs are desiccated from L2-L4 through L5-S1.  Disc space narrowing is moderate at L3-L4.  No worrisome marrow signal abnormality is seen. The distal spinal cord and conus are within normal limits, with conus terminating at L1.     There is no significant disc bulge, herniation, spinal canal, or foraminal stenosis from T12-L1 through L1-L2.     L2-L3: The disc is desiccated.  4 mm annular fissure is present in the right paracentral zone.  Ligamentum flavum thickening and facet arthropathy are mild.  Circumferential annular disc bulge is mild.  There is no spinal canal stenosis or foraminal stenosis.     L3-L4: Disc space narrowing is moderate, with subtle grade 1 anterolisthesis.  Circumferential annular disc bulge is mild.  Dorsal central zone annular disc fissure is present measuring at least 1 cm in length.  Ligamentum flavum thickening and facet arthropathy are moderate.  Thecal sac AP diameter in the midline is mildly reduced at 9 mm.  Bilateral foraminal stenosis is mild.     L4-L5: Circumferential annular disc bulge is mild.  An annular fissure is present in the left  foraminal-extraforaminal zone measuring at least 13 mm in length.  A right foraminal zone annular fissure measures approximately 5 mm in length.  Ligamentum flavum thickening and facet arthropathy are moderate.  No significant spinal canal stenosis or foraminal stenosis is appreciated.     L5-S1: Disc space narrowing is mild.  Circumferential annular disc bulge is mild.  Facet arthropathy is advanced.  There is no spinal canal stenosis or significant foraminal stenosis.     Impression:     Multilevel mild to moderate lumbar disc disease detailed above.     L3-L4 grade 1 anterolisthesis, with mild spinal canal stenosis and mild bilateral foraminal stenosis.        ASSESSMENT: Katarzyna Shi is a 62 y.o. female with symptoms consistent with Painful bladder syndrome.   1. IC (interstitial cystitis)           PLAN:    We reviewed the pathophysiology of interstitial/painful bladder syndrome. We also discussed the risk and benefits of additional work up and strategies for management. We discussed management options like short term use of Pyridium, use of prelief, pelvic floor physical therapy, hydroxyzine, avoidance of bladder irritants, limitation of environmental stressors, amitriptyline, gabapentin, Cysto with hydrodistension and bladder instillations. Recommended against self treatment.  Recommended patient at least drop off a urine for culture before beginning self-treatment, especially in light of negative results in the past.  The antibiotics may be resolving an inflammatory component and not necessarily treating an infection, as the cultures are usually negative.  Consider swabbing for mycoplasma/ureaplasma next visit if this persists, though it tends to be low yield.  Recommend using ibuprofen for flares and pyridium until a urine culture is done.  Of note, the location of her abdominal pain is not suprapubic.  It is adjacent to the termination of her midline scar, which is about 5 cm above the pubic  symphysis. Recently, the pain has gotten worse on the right side. This is a dull aching pain. Recommended that patient keep her appointment with GI.  Also advised that her Internist is doing a great job of trying to narrow down the etiology of her abdominal pain and she should definitely comply with all suggestions for further workup. The patient has expressed understanding, all her questions were answered and all her concerns were addressed. At this time the patient desires to try:    1) Pyridium 1 pills 3 times per day.  2) Diet changes - list of foods and Prelief  3) Increase vaginal estrogen to 3x per week  4) Cystoscopy    Her PUF questionnaire results show Symptom Score 17 and Bother Score of 11. Total 28.  Score 20+ = 91% Positive potassium test.  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      Orders Placed This Encounter   Procedures    Urinalysis, Reflex to Urine Culture Urine, Clean Catch    Urinalysis, Microscopic       Phenazopyridine (PYRIDIUM) 200 MG tablet, Take 1 tablet (200 mg total) by mouth 3 (three) times daily as needed for Pain., Disp: 90 tablet, Rfl: 2    Follow up: 4-6 weeks for medication efficacy and Cystoscopy    GUSTAVO Keyes MD  Pelvic Medicine and Reconstructive Surgery  Urogynecology  [facility]

## 2022-11-23 NOTE — TELEPHONE ENCOUNTER
Outgoing call to pt to see if she needs assistance with her Orencia 2023 PAP renewal.  Pt confirmed she will need assistance and requested PAP be emailed to address on file.     Provider portion of PAP faxed.

## 2022-11-30 NOTE — TELEPHONE ENCOUNTER
Incoming call from pt. Pt reports e-mail with application never received. Confirmed e-mail address on file. Will resend e-mail.

## 2022-11-30 NOTE — TELEPHONE ENCOUNTER
Outgoing call to pt to confirm she [facility] via [facility].  No answer, LVM.

## 2022-12-01 NOTE — TELEPHONE ENCOUNTER
Pt portion received via email.    Submitted complete application to [facility] to [phone] for review.  Will f/u.

## 2022-12-07 NOTE — TELEPHONE ENCOUNTER
Outgoing call to [employer] to check status of Orencia PAP renewal.  Rep Macrina confirmed received but will not be reviewed until after [place].  Rep stated pt portion may need to be resent due to fax being blurry and ineligible.      Outgoing call to pt to see if she will send a clearer copy of her application.  Pt voiced understanding.  Pt stated she is out of town and not return until 12/14.  Pt stated she will send as soon as she gets home.

## 2022-12-19 ENCOUNTER — OFFICE VISIT (OUTPATIENT)
Dept: RHEUMATOLOGY | Facility: CLINIC | Age: 62
End: 2022-12-19
Payer: MEDICARE

## 2022-12-19 DIAGNOSIS — D84.9 IMMUNOSUPPRESSED STATUS: ICD-10-CM

## 2022-12-19 DIAGNOSIS — Z51.81 ENCOUNTER FOR MEDICATION MONITORING: ICD-10-CM

## 2022-12-19 DIAGNOSIS — Z79.899 LONG TERM CURRENT USE OF IMMUNOSUPPRESSIVE DRUG: ICD-10-CM

## 2022-12-19 DIAGNOSIS — M05.79 RHEUMATOID ARTHRITIS INVOLVING MULTIPLE SITES WITH POSITIVE RHEUMATOID FACTOR: Primary | ICD-10-CM

## 2022-12-19 PROCEDURE — 99214 PR OFFICE/OUTPT VISIT, EST, LEVL IV, 30-39 MIN: ICD-10-PCS | Mod: 95,,, | Performed by: INTERNAL MEDICINE

## 2022-12-19 PROCEDURE — 99214 OFFICE O/P EST MOD 30 MIN: CPT | Mod: 95,,, | Performed by: INTERNAL MEDICINE

## 2022-12-19 NOTE — PROGRESS NOTES
RHEUMATOLOGY FOLLOW UP - TELE VISIT     The patient location is: [place]  The chief complaint leading to consultation is:  Rheumatoid arthritis  Visit type: Virtual visit with synchronous audio and video  Total time spent with patient:  10 minutes  Each patient to whom he or she provides medical services by telemedicine is:  (1) informed of the relationship between the physician and patient and the respective role of any other health care provider with respect to management of the patient; and (2) notified that he or she may decline to receive medical services by telemedicine and may withdraw from such care at any time.    Chief complaints, HPI, ROS, EXAM, Assessment & Plans:-  Katarzyna MAI Jose Guadalupenaveen a 62 y.o. pleasant female seen today for follow-up for rheumatoid arthritis- seropositive nonerosive rheumatoid arthritis.  Had a severe flare back in March and was restarted on Orencia.  She reports significant improvement in her symptoms since starting Orencia.  More than 50% improvement in joint pain, swelling and stiffness.  Still has morning stiffness lasting up to 30 minutes.  Feeling better overall.  Rheumatological review of system negative otherwise.  Exam shows improvement of synovitis and fist formation.  1. Rheumatoid arthritis involving multiple sites with positive rheumatoid factor    2. Immunosuppressed status    3. Long term current use of immunosuppressive drug    4. Encounter for medication monitoring      Problem List Items Addressed This Visit       Rheumatoid arthritis involving multiple sites with positive rheumatoid factor - Primary    Long term current use of immunosuppressive drug    Immunosuppressed status    Encounter for medication monitoring       Improving seropositive nonerosive rheumatoid arthritis on Orencia.  Failed methotrexate, Enbrel, Humira and Actemra in the past.  Continue Orencia weekly.  Compromised immune system secondary to autoimmune disease and use of immunosuppressive drugs.  Monitor carefully for infections. Advised to get immediate medical care if any infection. Also advised strict adherence to age appropriate vaccinations and cancer screenings with PCP.  [facility] labs reviewed - STABLE CBC, CMP, ESR AND CRP.   Hold Orencia if any infection  Safety labs before next visit   # Follow up in about 6 months (around 2023).      Past Medical History:   Diagnosis Date    Acid reflux     Anxiety     Arthritis     Atrial fibrillation     Clotting disorder     Emphysema of lung     Lung mass     Thyroid disease        Past Surgical History:   Procedure Laterality Date     SECTION N/A 1982    Vertical skin incision, unknown uterine scar     SECTION N/A 1985    Vertical skin incision, unknown uterine scar     SECTION N/A 1988    Vertical skin incision, unknown uterine scar        Social History     Tobacco Use    Smoking status: Never     Passive exposure: Never    Smokeless tobacco: Never   Substance Use Topics    Alcohol use: Yes     Alcohol/week: 1.0 standard drink     Types: 1 Glasses of wine per week     Comment: usually with dinner    Drug use: Never       Family History   Problem Relation Age of Onset    Thyroid disease Mother     Arthritis Mother     Heart disease Mother     Alzheimer's disease Father     Heart disease Father     Heart disease Sister        Review of patient's allergies indicates:   Allergen Reactions    Sulfa (sulfonamide antibiotics)      Other reaction(s): Interaction with others - finding  interacts with methotrexate         Medication List with Changes/Refills   Current Medications    ABATACEPT (ORENCIA CLICKJECT) 125 MG/ML ATIN    Inject 125 mg into the skin every 7 days.    ASPIRIN (ECOTRIN) 81 MG EC TABLET    Take 81 mg by mouth.    ESTRADIOL (ESTRACE) 0.01 % (0.1 MG/GRAM) VAGINAL CREAM    Place 1 g vaginally twice a week.    OLMESARTAN (BENICAR) 20 MG TABLET    Take 20 mg by mouth once daily.    ONDANSETRON (ZOFRAN-ODT) 4 MG TBDL     Take 1 tablet (4 mg total) by mouth every 8 (eight) hours as needed (nausea).    VERAPAMIL (VERELAN) 120 MG C24P    Take 120 mg by mouth once daily.       Disclaimer: This note was prepared using voice recognition system and is likely to have sound alike errors and is not proof read.  Please call me with any questions.

## 2022-12-19 NOTE — TELEPHONE ENCOUNTER
Outgoing call to pt regarding Orencia PAP renewal.  Calling to confirm if pt has returned home from vacation and has been able to resend application.  No answer, LVM.  Will continue to f/u.

## 2022-12-21 NOTE — TELEPHONE ENCOUNTER
Outgoing call to pt to confirm if she has resent Orencia PAP renewal yet.  Pt stated she forgot and will send it as soon as she gets home today.  Pt requested to send email to her email address on file to attach renewal application to.  Email sent to email on file.  Will f/u.

## 2022-12-23 NOTE — TELEPHONE ENCOUNTER
Outgoing call to [facility] to confirm if updated pt portion of Orencia PAP renewal received.  Rep Jennifer confirmed application was received and still currently under review.  Will continue to f/u.

## 2023-01-04 NOTE — TELEPHONE ENCOUNTER
Outgoing call to [facility] to check status of 2023 Orencia PAP renewal.  Rep Christi stated application is still under review and determination should be made within 3-5 business days.  Rep stated to call back to escalate review to urgent if pt needs sooner.  Will continue to f/u.

## 2023-01-11 NOTE — TELEPHONE ENCOUNTER
Outgoing call to [facility] to check status of Orencia PAP renewal.  Rep Yeny stated application is still pending review.  Will continue to f/u.

## 2023-01-13 NOTE — TELEPHONE ENCOUNTER
Received fax from [employer] denying pt's renewal application for Orencia due to being over income limit.  Outgoing call to [employer] to verify what income is being reported on the pt's consumer report.  Rep Cates unable to disclose but confirmed pt's income documents were with application and escalate application for processing.

## 2023-05-02 ENCOUNTER — PATIENT MESSAGE (OUTPATIENT)
Dept: RHEUMATOLOGY | Facility: CLINIC | Age: 63
End: 2023-05-02
Payer: MEDICARE

## 2023-05-02 DIAGNOSIS — M05.79 RHEUMATOID ARTHRITIS INVOLVING MULTIPLE SITES WITH POSITIVE RHEUMATOID FACTOR: Primary | ICD-10-CM

## 2023-05-19 ENCOUNTER — PATIENT MESSAGE (OUTPATIENT)
Dept: RHEUMATOLOGY | Facility: CLINIC | Age: 63
End: 2023-05-19
Payer: MEDICARE

## 2023-05-19 DIAGNOSIS — M06.9 FLARE OF RHEUMATOID ARTHRITIS: ICD-10-CM

## 2023-05-19 DIAGNOSIS — M05.79 RHEUMATOID ARTHRITIS INVOLVING MULTIPLE SITES WITH POSITIVE RHEUMATOID FACTOR: ICD-10-CM

## 2023-05-19 RX ORDER — ABATACEPT 125 MG/ML
125 INJECTION, SOLUTION SUBCUTANEOUS
Qty: 4 EACH | Refills: 11 | Status: SHIPPED | OUTPATIENT
Start: 2023-05-19 | End: 2024-02-22 | Stop reason: SDUPTHER

## 2023-05-19 RX ORDER — ABATACEPT 125 MG/ML
125 INJECTION, SOLUTION SUBCUTANEOUS
Qty: 4 EACH | Refills: 11 | Status: CANCELLED | OUTPATIENT
Start: 2023-05-19

## 2023-06-06 ENCOUNTER — TELEPHONE (OUTPATIENT)
Dept: RHEUMATOLOGY | Facility: CLINIC | Age: 63
End: 2023-06-06
Payer: MEDICARE

## 2023-06-13 ENCOUNTER — PATIENT MESSAGE (OUTPATIENT)
Dept: RHEUMATOLOGY | Facility: CLINIC | Age: 63
End: 2023-06-13
Payer: MEDICARE

## 2023-06-14 DIAGNOSIS — Z79.899 LONG TERM CURRENT USE OF IMMUNOSUPPRESSIVE DRUG: Primary | ICD-10-CM

## 2023-06-15 ENCOUNTER — TELEPHONE (OUTPATIENT)
Dept: RHEUMATOLOGY | Facility: CLINIC | Age: 63
End: 2023-06-15
Payer: MEDICARE

## 2023-06-20 ENCOUNTER — OFFICE VISIT (OUTPATIENT)
Dept: RHEUMATOLOGY | Facility: CLINIC | Age: 63
End: 2023-06-20
Payer: COMMERCIAL

## 2023-06-20 ENCOUNTER — PATIENT MESSAGE (OUTPATIENT)
Dept: RHEUMATOLOGY | Facility: CLINIC | Age: 63
End: 2023-06-20
Payer: MEDICARE

## 2023-06-20 DIAGNOSIS — D84.9 IMMUNOSUPPRESSED STATUS: ICD-10-CM

## 2023-06-20 DIAGNOSIS — E55.9 VITAMIN D DEFICIENCY: ICD-10-CM

## 2023-06-20 DIAGNOSIS — Z79.899 LONG TERM CURRENT USE OF IMMUNOSUPPRESSIVE DRUG: ICD-10-CM

## 2023-06-20 DIAGNOSIS — Z51.81 ENCOUNTER FOR MEDICATION MONITORING: ICD-10-CM

## 2023-06-20 DIAGNOSIS — M05.79 RHEUMATOID ARTHRITIS INVOLVING MULTIPLE SITES WITH POSITIVE RHEUMATOID FACTOR: Primary | ICD-10-CM

## 2023-06-20 PROCEDURE — 99214 OFFICE O/P EST MOD 30 MIN: CPT | Mod: 95,,, | Performed by: INTERNAL MEDICINE

## 2023-06-20 PROCEDURE — 99214 PR OFFICE/OUTPT VISIT, EST, LEVL IV, 30-39 MIN: ICD-10-PCS | Mod: 95,,, | Performed by: INTERNAL MEDICINE

## 2023-06-20 NOTE — PROGRESS NOTES
RHEUMATOLOGY FOLLOW UP - TELE VISIT     The patient location is: [place]  The chief complaint leading to consultation is:  Rheumatoid arthritis  Visit type: Virtual visit with synchronous audio and video  Total time spent with patient:  10 minutes  Each patient to whom he or she provides medical services by telemedicine is:  (1) informed of the relationship between the physician and patient and the respective role of any other health care provider with respect to management of the patient; and (2) notified that he or she may decline to receive medical services by telemedicine and may withdraw from such care at any time.    Chief complaints, HPI, ROS, EXAM, Assessment & Plans:-  Katarzyna MAI Jose Guadalupenaveen a 62 y.o. pleasant female seen today for follow-up for rheumatoid arthritis- seropositive nonerosive rheumatoid arthritis.  Had a severe flare and was restarted on Orencia.  She reports significant improvement in her symptoms since starting Orencia.  Denies any joint pain today.  Doing well.   Rheumatological review of system negative otherwise.  Exam shows 100% fist formation bilateral hands with stable deformities right 2nd and 3rd MCP.  1. Rheumatoid arthritis involving multiple sites with positive rheumatoid factor    2. Immunosuppressed status    3. Encounter for medication monitoring    4. Long term current use of immunosuppressive drug    5. Vitamin D deficiency      Problem List Items Addressed This Visit       Rheumatoid arthritis involving multiple sites with positive rheumatoid factor - Primary    Relevant Orders    DXA Bone Density Axial Skeleton 1 or more sites    CBC Auto Differential    Comprehensive Metabolic Panel    C-Reactive Protein    Sedimentation rate    Long term current use of immunosuppressive drug    Immunosuppressed status    Encounter for medication monitoring    Vitamin D deficiency    Relevant Orders    Vitamin D      Latest Reference Range & Units 06/14/23 09:07   WBC 3.8 - 10.8 Thousand/uL  7.4   RBC 3.80 - 5.10 Million/uL 4.58   Hemoglobin 11.7 - 15.5 g/dL 13.2   Hematocrit 35.0 - 45.0 % 40.6   MCV 80.0 - 100.0 fL 88.6   MCH 27.0 - 33.0 pg 28.8   MCHC 32.0 - 36.0 g/dL 32.5   RDW 11.0 - 15.0 % 13.8   Platelets 140 - 400 Thousand/uL 308   MPV 7.5 - 12.5 fL 9.6   Neutrophils Relative % 44.4   Lymph % % 46.8   Mono % % 6.8   Eosinophil % % 1.6   Basophil % % 0.4   Neutrophils, Abs 1,500 - 7,800 cells/uL 3,286   Lymph # 850 - 3,900 cells/uL 3,463   Mono # 200 - 950 cells/uL 503   Eos # 15 - 500 cells/uL 118   Baso # 0 - 200 cells/uL 30   Sed Rate < OR = 30 mm/h 2   Sodium 135 - 146 mmol/L 140   Potassium 3.5 - 5.3 mmol/L 4.1   Chloride 98 - 110 mmol/L 106   CO2 20 - 32 mmol/L 25   BUN 7 - 25 mg/dL 19   Creatinine 0.50 - 1.05 mg/dL 0.52   BUN/CREAT RATIO 6 - 22 (calc) NOT APPLICABLE   eGFR > OR = 60 mL/min/1.73m2 106   Glucose 65 - 99 mg/dL 104 (H)   Calcium 8.6 - 10.4 mg/dL 9.1   Alkaline Phosphatase 37 - 153 U/L 92   PROTEIN TOTAL 6.1 - 8.1 g/dL 6.1   Albumin 3.6 - 5.1 g/dL 4.0   Albumin/Globulin Ratio 1.0 - 2.5 (calc) 1.9   BILIRUBIN TOTAL 0.2 - 1.2 mg/dL 0.5   AST 10 - 35 U/L 10   ALT 6 - 29 U/L 10   CRP <8.0 mg/L 2.7   Globulin, Total 1.9 - 3.7 g/dL (calc) 2.1   (H): Data is abnormally high    Stable seropositive nonerosive rheumatoid arthritis on Orencia.  Failed methotrexate, Enbrel, Humira and Actemra in the past.  Continue Orencia weekly.  Compromised immune system secondary to autoimmune disease and use of immunosuppressive drugs. Monitor carefully for infections. Advised to get immediate medical care if any infection. Also advised strict adherence to age appropriate vaccinations and cancer screenings with PCP.  [facility] labs reviewed - STABLE CBC, CMP, ESR AND CRP.   Hold Orencia if any infection  Safety labs before next visit   # Follow up in about 6 months (around 12/20/2023).      Past Medical History:   Diagnosis Date    Acid reflux     Anxiety     Arthritis     Atrial fibrillation     Clotting  disorder     Emphysema of lung     Lung mass     Thyroid disease        Past Surgical History:   Procedure Laterality Date     SECTION N/A 1982    Vertical skin incision, unknown uterine scar     SECTION N/A 1985    Vertical skin incision, unknown uterine scar     SECTION N/A 1988    Vertical skin incision, unknown uterine scar        Social History     Tobacco Use    Smoking status: Never     Passive exposure: Never    Smokeless tobacco: Never   Substance Use Topics    Alcohol use: Yes     Alcohol/week: 1.0 standard drink     Types: 1 Glasses of wine per week     Comment: usually with dinner    Drug use: Never       Family History   Problem Relation Age of Onset    Thyroid disease Mother     Arthritis Mother     Heart disease Mother     Alzheimer's disease Father     Heart disease Father     Heart disease Sister        Review of patient's allergies indicates:   Allergen Reactions    Sulfa (sulfonamide antibiotics)      Other reaction(s): Interaction with others - finding  interacts with methotrexate         Medication List with Changes/Refills   Current Medications    ABATACEPT (ORENCIA CLICKJECT) 125 MG/ML ATIN    Inject 125 mg into the skin every 7 days.    ASPIRIN (ECOTRIN) 81 MG EC TABLET    Take 81 mg by mouth.    ESTRADIOL (ESTRACE) 0.01 % (0.1 MG/GRAM) VAGINAL CREAM    Place 1 g vaginally twice a week.    OLMESARTAN (BENICAR) 20 MG TABLET    Take 20 mg by mouth once daily.    VERAPAMIL (VERELAN) 120 MG C24P    Take 120 mg by mouth once daily.   Discontinued Medications    ONDANSETRON (ZOFRAN-ODT) 4 MG TBDL    Take 1 tablet (4 mg total) by mouth every 8 (eight) hours as needed (nausea).       Disclaimer: This note was prepared using voice recognition system and is likely to have sound alike errors and is not proof read.  Please call me with any questions.

## 2023-09-07 ENCOUNTER — PATIENT MESSAGE (OUTPATIENT)
Dept: RESEARCH | Facility: HOSPITAL | Age: 63
End: 2023-09-07
Payer: MEDICARE

## 2023-09-11 ENCOUNTER — HOSPITAL ENCOUNTER (OUTPATIENT)
Dept: RADIOLOGY | Facility: HOSPITAL | Age: 63
Discharge: HOME | End: 2023-09-11
Attending: INTERNAL MEDICINE
Payer: MEDICARE

## 2023-09-11 DIAGNOSIS — R91.1 PULMONARY NODULE: ICD-10-CM

## 2023-09-11 PROCEDURE — 71250 CT THORAX DX C-: CPT | Mod: TC

## 2023-11-13 ENCOUNTER — PATIENT MESSAGE (OUTPATIENT)
Dept: ADMINISTRATIVE | Facility: HOSPITAL | Age: 63
End: 2023-11-13
Payer: MEDICARE

## 2023-11-14 ENCOUNTER — PATIENT OUTREACH (OUTPATIENT)
Dept: ADMINISTRATIVE | Facility: HOSPITAL | Age: 63
End: 2023-11-14
Payer: MEDICARE

## 2023-11-14 DIAGNOSIS — Z12.31 BREAST CANCER SCREENING BY MAMMOGRAM: Primary | ICD-10-CM

## 2023-11-14 NOTE — LETTER
AUTHORIZATION FOR RELEASE OF   CONFIDENTIAL INFORMATION    Dear Dr. Singh,    We are seeing Katarzyna Shi, date of birth 1960, in the clinic at [place] [address] [facility]NAL [facility]. Eulalio Victoria MD is the patient's PCP. Katarzyna Shi has an outstanding lab/procedure at the time we reviewed her chart. In order to help keep her health information updated, she has authorized us to request the following medical record(s):        ( X )  MAMMOGRAM                                      (  )  COLONOSCOPY      ( X )  PAP SMEAR                                          (  )  OUTSIDE LAB RESULTS     (  )  DEXA SCAN                                          (  )  EYE EXAM            (  )  FOOT EXAM                                          (  )  ENTIRE RECORD     (  )  OUTSIDE IMMUNIZATIONS                 (  )  _______________         Please fax records to [facility], [place], Tyler M, MD, [phone]       If you have any questions, please contact Kaur at [phone]             Patient Name: Katarzyna Shi  : 1960  Patient Phone #: [phone]

## 2023-11-14 NOTE — LETTER
AUTHORIZATION FOR RELEASE OF   CONFIDENTIAL INFORMATION    Dear [facility],    We are seeing Katarzyna Shi, date of birth 1960, in the clinic at [place] [address] INTERNAL MEDICINE. Eulalio Victoria MD is the patient's PCP. Katarzyna Shi has an outstanding lab/procedure at the time we reviewed her chart. In order to help keep her health information updated, she has authorized us to request the following medical record(s):        ( X )  MAMMOGRAM                                      (  )  COLONOSCOPY      (  )  PAP SMEAR                                          (  )  OUTSIDE LAB RESULTS     (  )  DEXA SCAN                                          (  )  EYE EXAM            (  )  FOOT EXAM                                          (  )  ENTIRE RECORD     (  )  OUTSIDE IMMUNIZATIONS                 (  )  _______________         Please fax records to [facility], [place], Tyler M, MD, [phone]       If you have any questions, please contact Kaur at [phone]            Patient Name: Katarzyna Shi  : 1960  Patient Phone #: [phone]

## 2023-11-14 NOTE — PROGRESS NOTES
The following record(s)  below were uploaded for Health Maintenance .            PAP SMEAR 03/30/22    Record request to [facility] -no 2023 mammogram on file.   Ordered Mammogram and routed to [facility]

## 2024-01-02 ENCOUNTER — TELEPHONE (OUTPATIENT)
Dept: RHEUMATOLOGY | Facility: CLINIC | Age: 64
End: 2024-01-02
Payer: MEDICARE

## 2024-01-02 DIAGNOSIS — M05.79 RHEUMATOID ARTHRITIS INVOLVING MULTIPLE SITES WITH POSITIVE RHEUMATOID FACTOR: Primary | ICD-10-CM

## 2024-01-02 DIAGNOSIS — Z79.899 LONG TERM CURRENT USE OF IMMUNOSUPPRESSIVE DRUG: ICD-10-CM

## 2024-01-02 NOTE — TELEPHONE ENCOUNTER
Appt RS to February for a VV and lab orders faxed to [facility] [place]

## 2024-01-02 NOTE — TELEPHONE ENCOUNTER
----- Message from Jennifer Washington sent at 1/2/2024  9:53 AM CST -----  Contact: Katarzyna Flores is calling In regards to appt. Reports needing to reschedule upcoming appt and doesn't want to wait to be scheduled during the first available (May 1). Also states needing hand xrays ordered and scheduled. Please return call to pt at [phone].

## 2024-01-09 ENCOUNTER — OFFICE VISIT (OUTPATIENT)
Dept: INTERNAL MEDICINE | Facility: CLINIC | Age: 64
End: 2024-01-09
Payer: MEDICARE

## 2024-01-09 DIAGNOSIS — M54.6 PAIN IN THORACIC SPINE: Primary | ICD-10-CM

## 2024-01-09 DIAGNOSIS — M54.9 DORSALGIA, UNSPECIFIED: ICD-10-CM

## 2024-01-09 PROCEDURE — 1159F MED LIST DOCD IN RCRD: CPT | Mod: CPTII,,, | Performed by: INTERNAL MEDICINE

## 2024-01-09 PROCEDURE — 99214 OFFICE O/P EST MOD 30 MIN: CPT | Mod: ,,, | Performed by: INTERNAL MEDICINE

## 2024-01-09 PROCEDURE — 1160F RVW MEDS BY RX/DR IN RCRD: CPT | Mod: CPTII,,, | Performed by: INTERNAL MEDICINE

## 2024-01-09 RX ORDER — PREGABALIN 50 MG/1
50 CAPSULE ORAL 2 TIMES DAILY PRN
Qty: 60 CAPSULE | Refills: 6 | Status: SHIPPED | OUTPATIENT
Start: 2024-01-09 | End: 2024-02-19

## 2024-01-09 RX ORDER — ROSUVASTATIN CALCIUM 10 MG/1
10 TABLET, COATED ORAL
COMMUNITY
Start: 2023-12-18 | End: 2024-02-19

## 2024-01-09 NOTE — PROGRESS NOTES
Internal Medicine    Patient ID: [id number]     Chief Complaint: Flank Pain      HPI:     Katarzyna Shi is a 63 y.o. female here today for a follow up.     MRI of the lumbar spine reviewed patient with mild to moderate evidence of discogenic disease. there is a disc bulge at L3-L4 which is causing some mild canal stenosis.  Recommend follow-up with Neurosurgery or spine orthopedics for evaluation.  She reports never being seen about this this was back in 2022    She presents today with complaints of thoracic chest wall pain, tenderness to the skin, nerve pain.  She reports that the pain started when she was  attempting to turn over in bed and reprots a sharp knife like pain went throgh her. Her left chest wall and into the left axillia.  Since then the pain has been persistent, can not even touch that side of her chest wall just a lot of skin sensitivity pain is worsened with turning.    She does have a history of rheumatoid she is on treatment.  She does wear an estrogen patch she is due for breast imaging was ordered November    Past Medical History:   Diagnosis Date    Acid reflux     Anxiety     Arthritis     Atrial fibrillation     Clotting disorder     Emphysema of lung     Lung mass     Thyroid disease         Past Surgical History:   Procedure Laterality Date     SECTION N/A 1982    Vertical skin incision, unknown uterine scar     SECTION N/A 1985    Vertical skin incision, unknown uterine scar     SECTION N/A 1988    Vertical skin incision, unknown uterine scar        Social History     Tobacco Use    Smoking status: Never     Passive exposure: Never    Smokeless tobacco: Never   Substance and Sexual Activity    Alcohol use: Yes     Alcohol/week: 1.0 standard drink of alcohol     Types: 1 Glasses of wine per week     Comment: usually with dinner    Drug use: Never    Sexual activity: Yes     Partners: Male        Current Outpatient Medications   Medication  "Instructions    aspirin (ECOTRIN) 81 mg, Oral    estradioL (ESTRACE) 1 g, Vaginal, Twice weekly    olmesartan (BENICAR) 20 mg, Oral, Daily    ORENCIA CLICKJECT 125 mg, Subcutaneous, Every 7 days    pregabalin (LYRICA) 50 mg, Oral, 2 times daily PRN    rosuvastatin (CRESTOR) 10 mg, Oral, Twice weekly    verapamiL (VERELAN) 120 mg, Oral, Daily       Review of patient's allergies indicates:   Allergen Reactions    Sulfa (sulfonamide antibiotics)      Other reaction(s): Interaction with others - finding  interacts with methotrexate          Patient Care Team:  Eulalio Victoria MD as PCP - General (Internal Medicine)  Eulalio Victoria MD Salvaggio, Louis A, MD as Consulting Physician (Cardiovascular Disease)  OBIE Crisostomo MD as Consulting Physician (Pulmonary Disease)  Baron Mabry MD as Consulting Physician (Rheumatology)     Subjective:     Review of Systems    12 point review of systems conducted, negative except as stated in the history of present illness. See HPI for details.    Objective:     Visit Vitals  BP (P) 138/86 (BP Location: Left arm, Patient Position: Sitting, BP Method: Medium (Manual))   Pulse (P) 80   Temp (P) 98.5 °F (36.9 °C) (Temporal)   Resp (P) 16   Ht (P) 5' 2" (1.575 m)   Wt (P) 59.9 kg (132 lb)   SpO2 (P) 99%   BMI (P) 24.14 kg/m²       Physical Exam  General : Alert and oriented, No acute distress, afebrile.  Eye : PERRLA. EOMI. Normal conjunctiva, Sclerae are nonicteric.  Respiratory : Respirations are non-labored and clear to auscultation bilaterally. Symmetrical air entry bilaterally, no crackles, no wheezes, no rhonchi. No cyanosis, no clubbing.  Cardiovascular : Normal rate, Regular rhythm. No murmurs, rubs, or gallops. Pulses are 2+ throughout. No JVD. No Edema.  Gastrointestinal : Soft, nontender, non-distended, bowel sounds are present in all quadrants, no organomegaly, no guarding, no rebound.  Musculoskeletal : Normal range of motion throughout. No " "muscle tenderness.  Integumentary : Warm, moist, intact.  Neurologic : Alert, Oriented, sensory deficit left chest wall distal to the axilla/ more lateral than the breast  Psychiatric : Cooperative, Appropriate mood & affect.   Labs Reviewed:     Chemistry:  Lab Results   Component Value Date     06/14/2023    K 4.1 06/14/2023    CHLORIDE 105 02/05/2021    BUN 19 06/14/2023    CREATININE 0.52 06/14/2023    EGFRNORACEVR 106 06/14/2023    GLUCOSE 88 02/05/2021    CALCIUM 9.1 06/14/2023    ALKPHOS 78 02/05/2021    LABPROT 6.9 02/05/2021    ALBUMIN 4.0 06/14/2023    BILIDIR 0.2 02/05/2021    IBILI 0.30 02/05/2021    AST 10 06/14/2023    ALT 10 06/14/2023    ZSZBAFCN61MV 22.78 (L) 03/14/2019    TSH 1.290 03/14/2019        No results found for: "HGBA1C", "MICROALBCREA"     Hematology:  Lab Results   Component Value Date    WBC 7.4 06/14/2023    HGB 13.2 06/14/2023    HCT 40.6 06/14/2023     06/14/2023       Lipid Panel:  Lab Results   Component Value Date    CHOL 170 02/05/2021    HDL 54 02/05/2021    .00 02/05/2021    TRIG 77 02/05/2021    TOTALCHOLEST 3 02/05/2021        Urine:  Lab Results   Component Value Date    COLORUA Yellow 11/18/2022    APPEARANCEUA Clear 11/18/2022    SGUA 1.006 11/18/2022    PHUA 7.5 11/18/2022    PROTEINUA Negative 11/18/2022    GLUCOSEUA Negative 11/18/2022    KETONESUA Negative 11/18/2022    BLOODUA Negative 11/18/2022    NITRITESUA Negative 11/18/2022    LEUKOCYTESUR Negative 11/18/2022    RBCUA <5 11/18/2022    WBCUA <5 11/18/2022    BACTERIA None Seen 11/18/2022        Assessment:       ICD-10-CM ICD-9-CM   1. Pain in thoracic spine  M54.6 724.1   2. Dorsalgia, unspecified  M54.9 724.5        Plan:     1. Pain in thoracic spine  Assessment & Plan:  Case discussed with Neurosurgery plans for MRI thoracic spine.  Further recommendations to follow.  Lyrica 50 mg p.o. b.i.d. can increase dosing if needed.  Further recommendations to follow, RTC 4 weeks    Orders:  -     " MRI Thoracic Spine Without Contrast; Future; Expected date: 01/09/2024    2. Dorsalgia, unspecified  -     Cancel: MRI Lumbar Spine Without Contrast; Future; Expected date: 01/09/2024    Other orders  -     pregabalin (LYRICA) 50 MG capsule; Take 1 capsule (50 mg total) by mouth 2 (two) times daily as needed (pain).  Dispense: 60 capsule; Refill: 6         Follow up in about 4 weeks (around 2/6/2024). In addition to their scheduled follow up, the patient has also been instructed to follow up on as needed basis.     Future Appointments   Date Time Provider Department Center   1/10/2024 11:00 AM [facility] MRI1 [facility]   2/19/2024 11:45 AM Baron Mabry MD [facility] Luisana Victoria MD

## 2024-01-09 NOTE — ASSESSMENT & PLAN NOTE
Case discussed with Neurosurgery plans for MRI thoracic spine.  Further recommendations to follow.  Lyrica 50 mg p.o. b.i.d. can increase dosing if needed.  Further recommendations to follow, RTC 4 weeks

## 2024-01-11 DIAGNOSIS — N64.4 BREAST PAIN: Primary | ICD-10-CM

## 2024-01-12 ENCOUNTER — TELEPHONE (OUTPATIENT)
Dept: INTERNAL MEDICINE | Facility: CLINIC | Age: 64
End: 2024-01-12
Payer: MEDICARE

## 2024-01-12 NOTE — TELEPHONE ENCOUNTER
----- Message from Barb Hunter sent at 1/12/2024  9:27 AM CST -----  Regarding: return call  Type:  Patient Returning Call    Who Called: pt    Who Left Message for Patient: [facility] staff    Does the patient know what this is regarding?: appt information    Would the patient rather a call back or a response via [facility]? Yes    Best Call Back Number: [phone]    Additional Information:  pt says she still has not heard from anyone for a breast US appt, please advise,thanks

## 2024-01-12 NOTE — TELEPHONE ENCOUNTER
Spoke to pt and let her know to call [facility] to be scheduled. Pt confirmed understanding   none

## 2024-02-01 ENCOUNTER — TELEPHONE (OUTPATIENT)
Dept: INTERNAL MEDICINE | Facility: CLINIC | Age: 64
End: 2024-02-01
Payer: MEDICARE

## 2024-02-01 DIAGNOSIS — Z12.31 ENCOUNTER FOR SCREENING MAMMOGRAM FOR MALIGNANT NEOPLASM OF BREAST: ICD-10-CM

## 2024-02-01 DIAGNOSIS — N64.4 BREAST PAIN: Primary | ICD-10-CM

## 2024-02-01 NOTE — TELEPHONE ENCOUNTER
----- Message from Sarah Worrell sent at 2/1/2024  1:59 PM CST -----  Regarding: mammogram  Type:  Mammogram    Caller is requesting to schedule their annual mammogram appointment.  Order is not listed in EPIC.  Please enter order and contact patient to schedule.  Name of Caller:pt  [facility]  Would the patient rather a call back or a response via [facility]? C/b  Best Call Back Number:[phone]    Additional Information: pt needs an order for a diagnostic mammogram in the system to be done before the US.  Although both can be done at the same time order needs to be in chart.

## 2024-02-02 ENCOUNTER — TELEPHONE (OUTPATIENT)
Dept: INTERNAL MEDICINE | Facility: CLINIC | Age: 64
End: 2024-02-02
Payer: MEDICARE

## 2024-02-02 DIAGNOSIS — N64.4 BREAST PAIN: Primary | ICD-10-CM

## 2024-02-02 NOTE — TELEPHONE ENCOUNTER
----- Message from Valencia Jimenez sent at 2/2/2024  8:07 AM CST -----  Regarding: med advice  .Type:  Needs Medical Advice    Who Called: [facility]  Symptoms (please be specific):    How long has patient had these symptoms:    Pharmacy name and phone #:    Would the patient rather a call back or a response via [facility]? Call back  Best Call Back Number: [phone]  Additional Information: Needs a diagnostics mammo order put instead of a reg one.

## 2024-02-19 ENCOUNTER — OFFICE VISIT (OUTPATIENT)
Dept: RHEUMATOLOGY | Facility: CLINIC | Age: 64
End: 2024-02-19
Payer: MEDICARE

## 2024-02-19 ENCOUNTER — PATIENT MESSAGE (OUTPATIENT)
Dept: RHEUMATOLOGY | Facility: CLINIC | Age: 64
End: 2024-02-19
Payer: MEDICARE

## 2024-02-19 DIAGNOSIS — M05.79 RHEUMATOID ARTHRITIS INVOLVING MULTIPLE SITES WITH POSITIVE RHEUMATOID FACTOR: Primary | ICD-10-CM

## 2024-02-19 DIAGNOSIS — M21.949 DEFORMITY OF HAND DUE TO RHEUMATOID ARTHRITIS: ICD-10-CM

## 2024-02-19 DIAGNOSIS — Z51.81 ENCOUNTER FOR MEDICATION MONITORING: ICD-10-CM

## 2024-02-19 DIAGNOSIS — Z79.899 DRUG-INDUCED IMMUNODEFICIENCY: ICD-10-CM

## 2024-02-19 DIAGNOSIS — M06.9 DEFORMITY OF HAND DUE TO RHEUMATOID ARTHRITIS: ICD-10-CM

## 2024-02-19 DIAGNOSIS — D84.821 DRUG-INDUCED IMMUNODEFICIENCY: ICD-10-CM

## 2024-02-19 DIAGNOSIS — E55.9 VITAMIN D DEFICIENCY: ICD-10-CM

## 2024-02-19 PROCEDURE — 99214 OFFICE O/P EST MOD 30 MIN: CPT | Mod: 95,,, | Performed by: INTERNAL MEDICINE

## 2024-02-19 NOTE — PROGRESS NOTES
RHEUMATOLOGY FOLLOW UP - TELE VISIT     The patient location is: [place]  The chief complaint leading to consultation is:  Rheumatoid arthritis  Visit type: Virtual visit with synchronous audio and video  Total time spent with patient:  10 minutes  Each patient to whom he or she provides medical services by telemedicine is:  (1) informed of the relationship between the physician and patient and the respective role of any other health care provider with respect to management of the patient; and (2) notified that he or she may decline to receive medical services by telemedicine and may withdraw from such care at any time.    Chief complaints, HPI, ROS, EXAM, Assessment & Plans:-  Katarzyna MAI Jose Guadalupenaveen a 63 y.o. pleasant female seen today for follow-up for rheumatoid arthritis- seropositive nonerosive rheumatoid arthritis.  Had a severe flare and was restarted on Orencia.  She reports significant improvement in her symptoms since starting Orencia.  Denies any joint pain today.  Doing well. Takes 3 doses of Orencia in a month without any flare.   Rheumatological review of system negative otherwise.  Exam shows 100% fist formation bilateral hands with stable deformities right 2nd and 3rd MCP and ulnar subluxation.   1. Rheumatoid arthritis involving multiple sites with positive rheumatoid factor    2. Drug-induced immunodeficiency    3. Encounter for medication monitoring    4. Vitamin D deficiency    5. 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      Problem List Items Addressed This Visit       Rheumatoid arthritis involving multiple sites with positive rheumatoid factor - Primary    Relevant Orders    CBC Auto Differential    Comprehensive Metabolic Panel    C-Reactive Protein    Sedimentation rate    Hepatitis B Core Antibody, Total    Hepatitis B Surface Ab, Qualitative    Hepatitis B Surface Antigen    Quantiferon Gold TB    X-Ray Hand 3 View Bilateral    Encounter for medication monitoring    Relevant Orders     CBC Auto Differential    Comprehensive Metabolic Panel    C-Reactive Protein    Sedimentation rate    Hepatitis B Core Antibody, Total    Hepatitis B Surface Ab, Qualitative    Hepatitis B Surface Antigen    Quantiferon Gold TB    X-Ray Hand 3 View Bilateral    Vitamin D deficiency    Drug-induced immunodeficiency    Relevant Orders    CBC Auto Differential    Comprehensive Metabolic Panel    C-Reactive Protein    Sedimentation rate    Hepatitis B Core Antibody, Total    Hepatitis B Surface Ab, Qualitative    Hepatitis B Surface Antigen    Quantiferon Gold TB    X-Ray Hand 3 View Bilateral    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    Relevant Orders    CBC Auto Differential    Comprehensive Metabolic Panel    C-Reactive Protein    Sedimentation rate    Hepatitis B Core Antibody, Total    Hepatitis B Surface Ab, Qualitative    Hepatitis B Surface Antigen    Quantiferon Gold TB    X-Ray Hand 3 View Bilateral      Latest Reference Range & Units 01/24/24 10:00   WBC 3.8 - 10.8 Thousand/uL 6.9   RBC 3.80 - 5.10 Million/uL 4.74   Hemoglobin 11.7 - 15.5 g/dL 13.2   Hematocrit 35.0 - 45.0 % 40.2   MCV 80.0 - 100.0 fL 84.8   MCH 27.0 - 33.0 pg 27.8   MCHC 32.0 - 36.0 g/dL 32.8   RDW 11.0 - 15.0 % 13.9   Platelet Count 140 - 400 Thousand/uL 264   MPV 7.5 - 12.5 fL 9.9   Neutrophils Relative % 39.9   Lymph % % 49.2   Mono % % 7.5   Eos % % 3.0   Basophil % % 0.4   Neutrophils, Abs 1,500 - 7,800 cells/uL 2,753   Lymph # 850 - 3,900 cells/uL 3,395   Mono # 200 - 950 cells/uL 518   Eos # 15 - 500 cells/uL 207   Baso # 0 - 200 cells/uL 28   Sed Rate < OR = 30 mm/h 2   Sodium 135 - 146 mmol/L 140   Potassium 3.5 - 5.3 mmol/L 4.0   Chloride 98 - 110 mmol/L 106   CO2 20 - 32 mmol/L 25   BUN 7 - 25 mg/dL 15   Creatinine 0.50 - 1.05 mg/dL 0.53   BUN/CREAT RATIO 6 - 22 (calc) SEE NOTE:   eGFR > OR = 60 mL/min/1.73m2 105   Glucose 65 - 99 mg/dL 94   Calcium 8.6 - 10.4 mg/dL 9.2   ALP 37 - 153 U/L 91   PROTEIN TOTAL 6.1 - 8.1 g/dL  6.4   Albumin 3.6 - 5.1 g/dL 4.2   Albumin/Globulin Ratio 1.0 - 2.5 (calc) 1.9   BILIRUBIN TOTAL 0.2 - 1.2 mg/dL 0.7   AST 10 - 35 U/L 16   ALT 6 - 29 U/L 12   CRP <8.0 mg/L 3.4   Globulin, Total 1.9 - 3.7 g/dL (calc) 2.2       Stable seropositive nonerosive rheumatoid arthritis on Orencia.  Failed methotrexate, Enbrel, Humira and Actemra in the past.  Continue Orencia weekly.  Drug induced immunodeficiency due to use of immunosuppressive drugs. Monitor carefully for infections. Advised to get immediate medical care if any infection. Also advised strict adherence to age appropriate vaccinations and cancer screenings with PCP.  [facility] labs reviewed - STABLE CBC, CMP, ESR AND CRP.   Hold Orencia if any infection  Safety labs and hand xray before next visit   # Follow up in about 1 year (around 2025).      Past Medical History:   Diagnosis Date    Acid reflux     Anxiety     Arthritis     Atrial fibrillation     Clotting disorder     Emphysema of lung     Lung mass     Thyroid disease        Past Surgical History:   Procedure Laterality Date     SECTION N/A 1982    Vertical skin incision, unknown uterine scar     SECTION N/A 1985    Vertical skin incision, unknown uterine scar     SECTION N/A 1988    Vertical skin incision, unknown uterine scar        Social History     Tobacco Use    Smoking status: Never     Passive exposure: Never    Smokeless tobacco: Never   Substance Use Topics    Alcohol use: Yes     Alcohol/week: 1.0 standard drink of alcohol     Types: 1 Glasses of wine per week     Comment: usually with dinner    Drug use: Never       Family History   Problem Relation Age of Onset    Thyroid disease Mother     Arthritis Mother     Heart disease Mother     Alzheimer's disease Father     Heart disease Father     Heart disease Sister        Review of patient's allergies indicates:   Allergen Reactions    Sulfa (sulfonamide antibiotics)      Other reaction(s): Interaction with  others - finding  interacts with methotrexate         Medication List with Changes/Refills   Current Medications    ABATACEPT (ORENCIA CLICKJECT) 125 MG/ML ATIN    Inject 125 mg into the skin every 7 days.    ASPIRIN (ECOTRIN) 81 MG EC TABLET    Take 81 mg by mouth.    ESTRADIOL (ESTRACE) 0.01 % (0.1 MG/GRAM) VAGINAL CREAM    Place 1 g vaginally twice a week.    OLMESARTAN (BENICAR) 20 MG TABLET    Take 20 mg by mouth once daily.    VERAPAMIL (VERELAN) 120 MG C24P    Take 120 mg by mouth once daily.   Discontinued Medications    PREGABALIN (LYRICA) 50 MG CAPSULE    Take 1 capsule (50 mg total) by mouth 2 (two) times daily as needed (pain).    ROSUVASTATIN (CRESTOR) 10 MG TABLET    Take 10 mg by mouth twice a week.       Disclaimer: This note was prepared using voice recognition system and is likely to have sound alike errors and is not proof read.  Please call me with any questions.        Answers submitted by the patient for this visit:  Rheumatology Questionnaire (Submitted on 2/17/2024)  fever: No  eye redness: No  mouth sores: No  headaches: No  shortness of breath: No  chest pain: No  trouble swallowing: No  diarrhea: No  constipation: No  unexpected weight change: No  genital sore: No  dysuria: No  During the last 3 days, have you had a skin rash?: No  Bruises or bleeds easily: No  cough: No

## 2024-02-22 DIAGNOSIS — Z79.899 LONG TERM CURRENT USE OF IMMUNOSUPPRESSIVE DRUG: ICD-10-CM

## 2024-02-22 DIAGNOSIS — M05.79 RHEUMATOID ARTHRITIS INVOLVING MULTIPLE SITES WITH POSITIVE RHEUMATOID FACTOR: ICD-10-CM

## 2024-02-22 DIAGNOSIS — D84.9 IMMUNOSUPPRESSED STATUS: ICD-10-CM

## 2024-02-22 DIAGNOSIS — M06.9 FLARE OF RHEUMATOID ARTHRITIS: ICD-10-CM

## 2024-02-22 RX ORDER — ABATACEPT 125 MG/ML
125 INJECTION, SOLUTION SUBCUTANEOUS
Qty: 4 EACH | Refills: 11 | Status: SHIPPED | OUTPATIENT
Start: 2024-02-22

## 2024-02-22 RX ORDER — ABATACEPT 125 MG/ML
125 INJECTION, SOLUTION SUBCUTANEOUS
Qty: 4 ML | Refills: 3 | Status: ACTIVE | OUTPATIENT
Start: 2024-02-22 | End: 2024-02-26 | Stop reason: SDUPTHER

## 2024-02-26 DIAGNOSIS — M05.79 RHEUMATOID ARTHRITIS INVOLVING MULTIPLE SITES WITH POSITIVE RHEUMATOID FACTOR: ICD-10-CM

## 2024-02-26 DIAGNOSIS — Z79.899 LONG TERM CURRENT USE OF IMMUNOSUPPRESSIVE DRUG: ICD-10-CM

## 2024-02-26 DIAGNOSIS — D84.9 IMMUNOSUPPRESSED STATUS: ICD-10-CM

## 2024-02-26 RX ORDER — ABATACEPT 125 MG/ML
125 INJECTION, SOLUTION SUBCUTANEOUS
Qty: 4 ML | Refills: 3 | Status: SHIPPED | OUTPATIENT
Start: 2024-02-26 | End: 2024-03-08 | Stop reason: SDUPTHER

## 2024-03-06 ENCOUNTER — PATIENT MESSAGE (OUTPATIENT)
Dept: RHEUMATOLOGY | Facility: CLINIC | Age: 64
End: 2024-03-06
Payer: MEDICARE

## 2024-03-08 DIAGNOSIS — M05.79 RHEUMATOID ARTHRITIS INVOLVING MULTIPLE SITES WITH POSITIVE RHEUMATOID FACTOR: ICD-10-CM

## 2024-03-08 DIAGNOSIS — D84.9 IMMUNOSUPPRESSED STATUS: ICD-10-CM

## 2024-03-08 DIAGNOSIS — Z79.899 LONG TERM CURRENT USE OF IMMUNOSUPPRESSIVE DRUG: ICD-10-CM

## 2024-03-10 RX ORDER — ABATACEPT 125 MG/ML
125 INJECTION, SOLUTION SUBCUTANEOUS
Qty: 4 ML | Refills: 3 | Status: ACTIVE | OUTPATIENT
Start: 2024-03-10 | End: 2024-03-12 | Stop reason: SDUPTHER

## 2024-03-11 ENCOUNTER — HOSPITAL ENCOUNTER (OUTPATIENT)
Dept: RADIOLOGY | Facility: HOSPITAL | Age: 64
Discharge: HOME | End: 2024-03-11
Attending: INTERNAL MEDICINE
Payer: MEDICARE

## 2024-03-11 DIAGNOSIS — N64.4 BREAST PAIN: ICD-10-CM

## 2024-03-11 PROCEDURE — 76641 ULTRASOUND BREAST COMPLETE: CPT | Mod: 26,,, | Performed by: RADIOLOGY

## 2024-03-11 PROCEDURE — 76641 ULTRASOUND BREAST COMPLETE: CPT | Mod: TC,50

## 2024-03-11 PROCEDURE — 77066 DX MAMMO INCL CAD BI: CPT | Mod: 26,,, | Performed by: RADIOLOGY

## 2024-03-11 PROCEDURE — 77062 BREAST TOMOSYNTHESIS BI: CPT | Mod: 26,,, | Performed by: RADIOLOGY

## 2024-03-11 PROCEDURE — 77066 DX MAMMO INCL CAD BI: CPT | Mod: TC

## 2024-03-12 DIAGNOSIS — Z79.899 LONG TERM CURRENT USE OF IMMUNOSUPPRESSIVE DRUG: ICD-10-CM

## 2024-03-12 DIAGNOSIS — M05.79 RHEUMATOID ARTHRITIS INVOLVING MULTIPLE SITES WITH POSITIVE RHEUMATOID FACTOR: ICD-10-CM

## 2024-03-12 DIAGNOSIS — D84.9 IMMUNOSUPPRESSED STATUS: ICD-10-CM

## 2024-03-12 RX ORDER — ABATACEPT 125 MG/ML
125 INJECTION, SOLUTION SUBCUTANEOUS
Qty: 4 ML | Refills: 3 | Status: SHIPPED | OUTPATIENT
Start: 2024-03-12

## 2024-07-08 ENCOUNTER — TELEPHONE (OUTPATIENT)
Dept: RHEUMATOLOGY | Facility: CLINIC | Age: 64
End: 2024-07-08
Payer: MEDICARE

## 2024-07-08 RX ORDER — METHYLPREDNISOLONE 4 MG/1
TABLET ORAL
Qty: 21 EACH | Refills: 1 | Status: SHIPPED | OUTPATIENT
Start: 2024-07-08 | End: 2024-07-29

## 2024-07-08 NOTE — TELEPHONE ENCOUNTER
----- Message from Kaela Syedeileen sent at 7/8/2024  9:15 AM CDT -----  Contact: pt  Pt is calling in rgd to her having a flare up and need medication. Please call her back at  [phone]  thanks/mpd

## 2024-07-08 NOTE — TELEPHONE ENCOUNTER
Pt called stating that she is having a flare up and would like a [employer]ose[employer] call in before she goes on vacation. CVC Amanda Houston

## 2024-10-01 PROBLEM — R91.8 MULTIPLE PULMONARY NODULES: Status: ACTIVE | Noted: 2024-10-01

## 2024-11-19 ENCOUNTER — PATIENT MESSAGE (OUTPATIENT)
Dept: NEUROLOGY | Facility: CLINIC | Age: 64
End: 2024-11-19
Payer: MEDICARE

## 2025-01-09 ENCOUNTER — OFFICE VISIT (OUTPATIENT)
Dept: URGENT CARE | Facility: CLINIC | Age: 65
End: 2025-01-09
Payer: COMMERCIAL

## 2025-01-09 VITALS
TEMPERATURE: 98 F | BODY MASS INDEX: 25.03 KG/M2 | OXYGEN SATURATION: 98 % | HEART RATE: 90 BPM | SYSTOLIC BLOOD PRESSURE: 192 MMHG | HEIGHT: 62 IN | WEIGHT: 136 LBS | RESPIRATION RATE: 20 BRPM | DIASTOLIC BLOOD PRESSURE: 102 MMHG

## 2025-01-09 DIAGNOSIS — L50.9 URTICARIA: Primary | ICD-10-CM

## 2025-01-09 RX ORDER — FAMOTIDINE 40 MG/1
40 TABLET, FILM COATED ORAL DAILY
COMMUNITY

## 2025-01-09 RX ORDER — PREDNISONE 10 MG/1
10 TABLET ORAL 2 TIMES DAILY
Qty: 10 TABLET | Refills: 0 | Status: SHIPPED | OUTPATIENT
Start: 2025-01-09 | End: 2025-01-14

## 2025-01-09 RX ORDER — FEXOFENADINE HCL 60 MG/1
60 TABLET, FILM COATED ORAL DAILY
COMMUNITY

## 2025-01-10 NOTE — PROGRESS NOTES
"Subjective:      Patient ID: Katarzyna Shi is a 64 y.o. female.    Vitals:  height is 5' 2" (1.575 m) and weight is 61.7 kg (136 lb). Her oral temperature is 98 °F (36.7 °C). Her blood pressure is 192/102 (abnormal) and her pulse is 90. Her respiration is 20 and oxygen saturation is 98%.     Chief Complaint: Rash     Patient is a 64 y.o. female who presents to urgent care with complaints of rash x3 days.  Patient states that she used a new soap and thinks that she is having a reaction to this.  She denies any oral swelling shortness for breath or sick symptoms.  BP noted on intake.  Patient has been taking Sudafed to alleviate congestion.      Rash      Skin:  Positive for rash and erythema.      Objective:     Physical Exam   HENT:   Head: Normocephalic and atraumatic.   Mouth/Throat: Mucous membranes are moist. No posterior oropharyngeal erythema.   Neck: Neck supple.   Abdominal: Normal appearance.   Neurological: She is alert.   Skin: Skin is rash. erythema and lesion   Erythematous base rash with overlying wheals consistent with urticaria noted on the trunk.  No appreciated oral swelling.         Previous History      Review of patient's allergies indicates:   Allergen Reactions    Sulfa (sulfonamide antibiotics)      Other reaction(s): Interaction with others - finding  interacts with methotrexate         Past Medical History:   Diagnosis Date    Acid reflux     Anxiety     Arthritis     Atrial fibrillation     Clotting disorder     Emphysema of lung     Lung mass     Thyroid disease      Current Outpatient Medications   Medication Instructions    aspirin (ECOTRIN) 81 mg    estradioL (ESTRACE) 1 g, Twice weekly    famotidine (PEPCID) 40 mg, Daily    fexofenadine (ALLEGRA) 60 mg, Daily    olmesartan (BENICAR) 20 mg, Daily    ORENCIA CLICKJECT 125 mg, Subcutaneous, Every 7 days    ORENCIA CLICKJECT 125 mg, Subcutaneous, Every 7 days    predniSONE (DELTASONE) 10 mg, Oral, 2 times daily    verapamiL (VERELAN) " "120 mg, Daily     Past Surgical History:   Procedure Laterality Date     SECTION N/A 1982    Vertical skin incision, unknown uterine scar     SECTION N/A 1985    Vertical skin incision, unknown uterine scar     SECTION N/A 1988    Vertical skin incision, unknown uterine scar     Family History   Problem Relation Name Age of Onset    Thyroid disease Mother Erna     Arthritis Mother Erna     Heart disease Mother Erna     Alzheimer's disease Father Crum     Heart disease Father Crum     Heart disease Sister Heidi        Social History     Tobacco Use    Smoking status: Never     Passive exposure: Never    Smokeless tobacco: Never   Substance Use Topics    Alcohol use: Yes     Alcohol/week: 1.0 standard drink of alcohol     Types: 1 Glasses of wine per week     Comment: usually with dinner    Drug use: Never        Physical Exam      Vital Signs Reviewed   BP (!) 192/102   Pulse 90   Temp 98 °F (36.7 °C) (Oral)   Resp 20   Ht 5' 2" (1.575 m)   Wt 61.7 kg (136 lb)   SpO2 98%   BMI 24.87 kg/m²        Procedures    Procedures     Labs     Results for orders placed or performed in visit on 23   CBC auto differential    Collection Time: 24 10:00 AM   Result Value Ref Range    WBC 6.9 3.8 - 10.8 Thousand/uL    RBC 4.74 3.80 - 5.10 Million/uL    Hemoglobin 13.2 11.7 - 15.5 g/dL    Hematocrit 40.2 35.0 - 45.0 %    MCV 84.8 80.0 - 100.0 fL    MCH 27.8 27.0 - 33.0 pg    MCHC 32.8 32.0 - 36.0 g/dL    RDW 13.9 11.0 - 15.0 %    Platelets 264 140 - 400 Thousand/uL    MPV 9.9 7.5 - 12.5 fL    Neutrophils, Abs 2,753 1,500 - 7,800 cells/uL    Lymph # 3,395 850 - 3,900 cells/uL    Mono # 518 200 - 950 cells/uL    Eos # 207 15 - 500 cells/uL    Baso # 28 0 - 200 cells/uL    Neutrophils Relative 39.9 %    Lymph % 49.2 %    Mono % 7.5 %    Eosinophil % 3.0 %    Basophil % 0.4 %   Comprehensive metabolic panel    Collection Time: 24 10:00 AM   Result Value Ref Range    " Glucose 94 65 - 99 mg/dL    BUN 15 7 - 25 mg/dL    Creatinine 0.53 0.50 - 1.05 mg/dL    eGFR 105 > OR = 60 mL/min/1.73m2    BUN/Creatinine Ratio SEE NOTE: 6 - 22 (calc)    Sodium 140 135 - 146 mmol/L    Potassium 4.0 3.5 - 5.3 mmol/L    Chloride 106 98 - 110 mmol/L    CO2 25 20 - 32 mmol/L    Calcium 9.2 8.6 - 10.4 mg/dL    Total Protein 6.4 6.1 - 8.1 g/dL    Albumin 4.2 3.6 - 5.1 g/dL    Globulin, Total 2.2 1.9 - 3.7 g/dL (calc)    Albumin/Globulin Ratio 1.9 1.0 - 2.5 (calc)    Total Bilirubin 0.7 0.2 - 1.2 mg/dL    Alkaline Phosphatase 91 37 - 153 U/L    AST 16 10 - 35 U/L    ALT 12 6 - 29 U/L   C-Reactive Protein    Collection Time: 01/24/24 10:00 AM   Result Value Ref Range    CRP 3.4 <8.0 mg/L   Sedimentation Rate    Collection Time: 01/24/24 10:00 AM   Result Value Ref Range    Sed Rate 2 < OR = 30 mm/h       Assessment:     1. Urticaria        Plan:       Urticaria    Other orders  -     predniSONE (DELTASONE) 10 MG tablet; Take 1 tablet (10 mg total) by mouth 2 (two) times daily. for 5 days  Dispense: 10 tablet; Refill: 0      Take Claritin or Benadryl as directed per package instructions. May cause sedation/drowsiness (safety precautions discussed).  Follow-up with your Primary Care Provider as needed.   Go to the emergency department with any significant change or worsening of symptoms.  Please monitor for any signs of infection such as worsening redness, swelling, pain, purulent discharge, fever, body aches, or chills and seek follow-up care as needed.    Please check your blood pressure when you get home.   when your blood pressure returns to within a normal range you should start on prednisone at that time.

## 2025-01-10 NOTE — PATIENT INSTRUCTIONS
Take Claritin or Benadryl as directed per package instructions. May cause sedation/drowsiness (safety precautions discussed).  Follow-up with your Primary Care Provider as needed.   Go to the emergency department with any significant change or worsening of symptoms.  Please monitor for any signs of infection such as worsening redness, swelling, pain, purulent discharge, fever, body aches, or chills and seek follow-up care as needed.    Please check your blood pressure when you get home.   when your blood pressure returns to within a normal range you should start on prednisone at that time.

## 2025-01-14 ENCOUNTER — TELEPHONE (OUTPATIENT)
Dept: RHEUMATOLOGY | Facility: CLINIC | Age: 65
End: 2025-01-14
Payer: COMMERCIAL

## 2025-01-14 ENCOUNTER — OFFICE VISIT (OUTPATIENT)
Dept: INTERNAL MEDICINE | Facility: CLINIC | Age: 65
End: 2025-01-14
Payer: COMMERCIAL

## 2025-01-14 ENCOUNTER — TELEPHONE (OUTPATIENT)
Dept: INTERNAL MEDICINE | Facility: CLINIC | Age: 65
End: 2025-01-14
Payer: COMMERCIAL

## 2025-01-14 VITALS
OXYGEN SATURATION: 98 % | TEMPERATURE: 97 F | DIASTOLIC BLOOD PRESSURE: 76 MMHG | BODY MASS INDEX: 25.21 KG/M2 | RESPIRATION RATE: 16 BRPM | HEART RATE: 90 BPM | SYSTOLIC BLOOD PRESSURE: 120 MMHG | WEIGHT: 137 LBS | HEIGHT: 62 IN

## 2025-01-14 DIAGNOSIS — R53.1 WEAKNESS: Primary | ICD-10-CM

## 2025-01-14 DIAGNOSIS — I10 HYPERTENSION, UNSPECIFIED TYPE: ICD-10-CM

## 2025-01-14 DIAGNOSIS — M05.79 RHEUMATOID ARTHRITIS INVOLVING MULTIPLE SITES WITH POSITIVE RHEUMATOID FACTOR: Primary | ICD-10-CM

## 2025-01-14 LAB
ALBUMIN SERPL-MCNC: 4.1 G/DL (ref 3.4–4.8)
ALBUMIN/GLOB SERPL: 1.9 RATIO (ref 1.1–2)
ALP SERPL-CCNC: 114 UNIT/L (ref 40–150)
ALT SERPL-CCNC: 13 UNIT/L (ref 0–55)
ANION GAP SERPL CALC-SCNC: 9 MEQ/L
AST SERPL-CCNC: 11 UNIT/L (ref 5–34)
BASOPHILS # BLD AUTO: 0.07 X10(3)/MCL
BASOPHILS NFR BLD AUTO: 0.6 %
BILIRUB SERPL-MCNC: 0.3 MG/DL
BUN SERPL-MCNC: 17.2 MG/DL (ref 9.8–20.1)
CALCIUM SERPL-MCNC: 9 MG/DL (ref 8.4–10.2)
CHLORIDE SERPL-SCNC: 106 MMOL/L (ref 98–107)
CO2 SERPL-SCNC: 24 MMOL/L (ref 23–31)
CREAT SERPL-MCNC: 1.01 MG/DL (ref 0.55–1.02)
CREAT/UREA NIT SERPL: 17
EOSINOPHIL # BLD AUTO: 0 X10(3)/MCL (ref 0–0.9)
EOSINOPHIL NFR BLD AUTO: 0 %
ERYTHROCYTE [DISTWIDTH] IN BLOOD BY AUTOMATED COUNT: 14.3 % (ref 11.5–17)
EST. AVERAGE GLUCOSE BLD GHB EST-MCNC: 116.9 MG/DL
GFR SERPLBLD CREATININE-BSD FMLA CKD-EPI: >60 ML/MIN/1.73/M2
GLOBULIN SER-MCNC: 2.2 GM/DL (ref 2.4–3.5)
GLUCOSE SERPL-MCNC: 103 MG/DL (ref 82–115)
HBA1C MFR BLD: 5.7 %
HCT VFR BLD AUTO: 39.8 % (ref 37–47)
HGB BLD-MCNC: 13 G/DL (ref 12–16)
IMM GRANULOCYTES # BLD AUTO: 0.04 X10(3)/MCL (ref 0–0.04)
IMM GRANULOCYTES NFR BLD AUTO: 0.4 %
LYMPHOCYTES # BLD AUTO: 4.93 X10(3)/MCL (ref 0.6–4.6)
LYMPHOCYTES NFR BLD AUTO: 45.6 %
MCH RBC QN AUTO: 27.7 PG (ref 27–31)
MCHC RBC AUTO-ENTMCNC: 32.7 G/DL (ref 33–36)
MCV RBC AUTO: 84.7 FL (ref 80–94)
MONOCYTES # BLD AUTO: 0.79 X10(3)/MCL (ref 0.1–1.3)
MONOCYTES NFR BLD AUTO: 7.3 %
NEUTROPHILS # BLD AUTO: 4.98 X10(3)/MCL (ref 2.1–9.2)
NEUTROPHILS NFR BLD AUTO: 46.1 %
NRBC BLD AUTO-RTO: 0 %
PLATELET # BLD AUTO: 295 X10(3)/MCL (ref 130–400)
PMV BLD AUTO: 10.3 FL (ref 7.4–10.4)
POTASSIUM SERPL-SCNC: 4.3 MMOL/L (ref 3.5–5.1)
PROT SERPL-MCNC: 6.3 GM/DL (ref 5.8–7.6)
RBC # BLD AUTO: 4.7 X10(6)/MCL (ref 4.2–5.4)
SODIUM SERPL-SCNC: 139 MMOL/L (ref 136–145)
TSH SERPL-ACNC: 1.37 UIU/ML (ref 0.35–4.94)
WBC # BLD AUTO: 10.81 X10(3)/MCL (ref 4.5–11.5)

## 2025-01-14 PROCEDURE — 85025 COMPLETE CBC W/AUTO DIFF WBC: CPT | Performed by: INTERNAL MEDICINE

## 2025-01-14 PROCEDURE — 3074F SYST BP LT 130 MM HG: CPT | Mod: CPTII,,, | Performed by: INTERNAL MEDICINE

## 2025-01-14 PROCEDURE — 80053 COMPREHEN METABOLIC PANEL: CPT | Performed by: INTERNAL MEDICINE

## 2025-01-14 PROCEDURE — 99214 OFFICE O/P EST MOD 30 MIN: CPT | Mod: ,,, | Performed by: INTERNAL MEDICINE

## 2025-01-14 PROCEDURE — 83036 HEMOGLOBIN GLYCOSYLATED A1C: CPT | Performed by: INTERNAL MEDICINE

## 2025-01-14 PROCEDURE — 3008F BODY MASS INDEX DOCD: CPT | Mod: CPTII,,, | Performed by: INTERNAL MEDICINE

## 2025-01-14 PROCEDURE — 1159F MED LIST DOCD IN RCRD: CPT | Mod: CPTII,,, | Performed by: INTERNAL MEDICINE

## 2025-01-14 PROCEDURE — 36415 COLL VENOUS BLD VENIPUNCTURE: CPT | Performed by: INTERNAL MEDICINE

## 2025-01-14 PROCEDURE — 3078F DIAST BP <80 MM HG: CPT | Mod: CPTII,,, | Performed by: INTERNAL MEDICINE

## 2025-01-14 PROCEDURE — 84443 ASSAY THYROID STIM HORMONE: CPT | Performed by: INTERNAL MEDICINE

## 2025-01-14 RX ORDER — BUSPIRONE HYDROCHLORIDE 5 MG/1
5 TABLET ORAL 3 TIMES DAILY PRN
Qty: 60 TABLET | Refills: 3 | Status: SHIPPED | OUTPATIENT
Start: 2025-01-14 | End: 2026-01-14

## 2025-01-14 RX ORDER — HYDROXYCHLOROQUINE SULFATE 200 MG/1
200 TABLET, FILM COATED ORAL 2 TIMES DAILY
Qty: 60 TABLET | Refills: 6 | Status: SHIPPED | OUTPATIENT
Start: 2025-01-14

## 2025-01-14 RX ORDER — DOXAZOSIN 2 MG/1
2 TABLET ORAL DAILY
COMMUNITY
Start: 2025-01-13

## 2025-01-14 RX ORDER — VERAPAMIL HYDROCHLORIDE 240 MG/1
240 CAPSULE, EXTENDED RELEASE ORAL NIGHTLY
COMMUNITY
Start: 2025-01-11

## 2025-01-14 RX ORDER — EPINEPHRINE 0.3 MG/.3ML
0.3 INJECTION SUBCUTANEOUS DAILY PRN
COMMUNITY
Start: 2025-01-11

## 2025-01-14 RX ORDER — PANTOPRAZOLE SODIUM 40 MG/1
40 TABLET, DELAYED RELEASE ORAL
COMMUNITY
Start: 2024-11-14

## 2025-01-14 NOTE — TELEPHONE ENCOUNTER
Patient has not taken her Orencia in 2 months due to a cough which she was treated with a few different antibiotics.  Cough has now resolved.    Was seen also in ER 1/11 for allergic reaction and elevated BP. Cardiologist changed Benicar to Cardura yesterday to see if that improves BP.  Rash has started to improve with 2 days of MDP (patient stopped due to concerns of elevated BP)    She states that she has been under a lot of stress lately trying to build a new house and she plans to see PCP today to request something for stress and anxiety.    Her RA has been pretty controlled but she is starting to experience some joint pain and inflammation.  Patient wanted to check with you to see if you think she should restart her Orencia.

## 2025-01-14 NOTE — TELEPHONE ENCOUNTER
----- Message from Justine sent at 1/14/2025  8:11 AM CST -----  .Who Called: Katarzyna Shi    Caller is requesting assistance/information from provider's office.    Symptoms (please be specific): n/a   How long has patient had these symptoms:  n/a  List of preferred pharmacies on file (remove unneeded): [unfilled]  If different, enter pharmacy into here including location and phone number: n/a      Preferred Method of Contact: Phone Call    Patient's Preferred Phone Number on File: [phone]     Best Call Back Number, if different:    Additional Information: Pt called stating she would not make 8:20a appointment this morning. Pt r/s for 2:20p today, let previous appointment on schedule.

## 2025-01-14 NOTE — ASSESSMENT & PLAN NOTE
Cough resolved with discontinuation of Benicar blood pressure well controlled on verapamil and Cardura she reports that she does not feel well today she is on steroids for presumed allergic reaction advise her to discontinue.  Will see how she feels we will place a CGM today and check some screening labs but I think all that is going to be fine  Follow up with Cardiology as scheduled next week if no resolution will need monitor placement

## 2025-01-14 NOTE — PROGRESS NOTES
Internal Medicine    Patient ID: [id number]     Chief Complaint: Hypertension      HPI:     Katarzyna Shi is a 64 y.o. female here today for a follow up.   Off Benicar secondary to swelling of the lips and cough  Stopped taking Orencia shots  She is on Cardura now for BP  She felt faint, weak this am  Right arm BP is higher in left than right arm  Feels weak   On 25, she was placed on a medrol dose pack for a rash. She had recently changed her soap.  She currently is taking steroids         EKG sinus rhythm  3/24 gyyxahe-5-34% right ICA, right thyroid nodule/cyst  3/24 labs-, AST 13, ALT 13  3/22 echo-EF 60, 1+ TR, trace MR   CT chest-right lower lobe nodule, follow-up PET CT recommended   calcium score-2   nuclear stress-normal Assessment     Past Medical History:   Diagnosis Date    Acid reflux     Anxiety     Arthritis     Atrial fibrillation     Clotting disorder     Emphysema of lung     Lung mass     Thyroid disease         Past Surgical History:   Procedure Laterality Date     SECTION N/A 1982    Vertical skin incision, unknown uterine scar     SECTION N/A 1985    Vertical skin incision, unknown uterine scar     SECTION N/A 1988    Vertical skin incision, unknown uterine scar        Social History     Tobacco Use    Smoking status: Never     Passive exposure: Never    Smokeless tobacco: Never   Substance and Sexual Activity    Alcohol use: Yes     Alcohol/week: 1.0 standard drink of alcohol     Types: 1 Glasses of wine per week     Comment: usually with dinner    Drug use: Never    Sexual activity: Yes     Partners: Male        Current Outpatient Medications   Medication Instructions    aspirin (ECOTRIN) 81 mg    busPIRone (BUSPAR) 5 mg, Oral, 3 times daily PRN    doxazosin (CARDURA) 2 mg, Daily    EPINEPHrine (EPIPEN) 0.3 mg, Daily PRN    fexofenadine (ALLEGRA) 60 mg, Daily    ORENCIA CLICKJECT 125 mg, Subcutaneous, Every 7 days    ORENCIA  "CLICKJECT 125 mg, Subcutaneous, Every 7 days    pantoprazole (PROTONIX) 40 mg, As needed (PRN)    verapamiL (VERELAN) 240 mg, Nightly       Review of patient's allergies indicates:   Allergen Reactions    Sulfa (sulfonamide antibiotics)      Other reaction(s): Interaction with others - finding  interacts with methotrexate          Patient Care Team:  Eulalio Victoria MD as PCP - General (Internal Medicine)  Eulalio Victoria MD Salvaggio, Louis A, MD as Consulting Physician (Cardiovascular Disease)  OBIE Crisostomo MD as Consulting Physician (Pulmonary Disease)  Baron Mabry MD as Consulting Physician (Rheumatology)     Subjective:     Review of Systems    12 point review of systems conducted, negative except as stated in the history of present illness. See HPI for details.    Objective:     Visit Vitals  /76 (BP Location: Left arm, Patient Position: Sitting)   Pulse 90   Temp 97.3 °F (36.3 °C) (Temporal)   Resp 16   Ht 5' 2" (1.575 m)   Wt 62.1 kg (137 lb)   SpO2 98%   BMI 25.06 kg/m²       Physical Exam  Constitutional:       Appearance: Normal appearance.   HENT:      Head: Normocephalic and atraumatic.   Eyes:      Extraocular Movements: Extraocular movements intact.      Pupils: Pupils are equal, round, and reactive to light.   Cardiovascular:      Rate and Rhythm: Normal rate and regular rhythm.   Pulmonary:      Effort: Pulmonary effort is normal.      Breath sounds: Normal breath sounds.   Skin:     General: Skin is warm and dry.   Neurological:      General: No focal deficit present.      Mental Status: She is alert.   Psychiatric:         Mood and Affect: Mood normal.         Labs Reviewed:     Chemistry:  Lab Results   Component Value Date     01/24/2024    K 4.0 01/24/2024    BUN 15 01/24/2024    CREATININE 0.53 01/24/2024    EGFRNORACEVR 105 01/24/2024    GLUCOSE 88 02/05/2021    CALCIUM 9.2 01/24/2024    ALKPHOS 78 02/05/2021    LABPROT 6.9 02/05/2021    ALBUMIN " "4.2 01/24/2024    BILIDIR 0.2 02/05/2021    IBILI 0.30 02/05/2021    AST 16 01/24/2024    ALT 12 01/24/2024    HSBWSWTZ91AM 22.78 (L) 03/14/2019    TSH 1.290 03/14/2019        No results found for: "HGBA1C", "MICROALBCREA"     Hematology:  Lab Results   Component Value Date    WBC 6.9 01/24/2024    HGB 13.2 01/24/2024    HCT 40.2 01/24/2024     01/24/2024       Lipid Panel:  Lab Results   Component Value Date    CHOL 170 02/05/2021    HDL 54 02/05/2021    .00 02/05/2021    TRIG 77 02/05/2021    TOTALCHOLEST 3 02/05/2021        Urine:  Lab Results   Component Value Date    APPEARANCEUA Clear 11/18/2022    SGUA 1.006 11/18/2022    PROTEINUA Negative 11/18/2022    KETONESUA Negative 11/18/2022    LEUKOCYTESUR Negative 11/18/2022    RBCUA <5 11/18/2022    WBCUA <5 11/18/2022    BACTERIA None Seen 11/18/2022        Assessment:       ICD-10-CM ICD-9-CM   1. Weakness  R53.1 780.79   2. Hypertension, unspecified type  I10 401.9        Plan:     1. Weakness  -     CBC Auto Differential; Future; Expected date: 01/14/2025  -     Comprehensive Metabolic Panel; Future; Expected date: 01/14/2025  -     TSH; Future; Expected date: 01/14/2025    2. Hypertension, unspecified type  Assessment & Plan:  Cough resolved with discontinuation of Benicar blood pressure well controlled on verapamil and Cardura she reports that she does not feel well today she is on steroids for presumed allergic reaction advise her to discontinue.  Will see how she feels we will place a CGM today and check some screening labs but I think all that is going to be fine  Follow up with Cardiology as scheduled next week if no resolution will need monitor placement      Other orders  -     busPIRone (BUSPAR) 5 MG Tab; Take 1 tablet (5 mg total) by mouth 3 (three) times daily as needed (anxiety).  Dispense: 60 tablet; Refill: 3         No follow-ups on file. In addition to their scheduled follow up, the patient has also been instructed to follow up on " as needed basis.     Future Appointments   Date Time Provider Department Center   2/24/2025 12:15 PM Baron Mabry MD [facility] [place] MD

## 2025-01-14 NOTE — TELEPHONE ENCOUNTER
----- Message from Tamika sent at 1/14/2025  8:55 AM CST -----  Contact: [phone] Patient  .[email or web address]     Patient is calling for Medical Advice regarding:BP has been high and having issues. Patient need to talk about her injection . Please call and advise     How long has patient had these symptoms:    Pharmacy name and phone#:    Patient wants a call back or thru [facility]:call     Comments:    Please advise patient replies from provider may take up to 48 hours.

## 2025-01-15 NOTE — TELEPHONE ENCOUNTER
Please request to try Plaquenil sent to her pharmacy today if arthritis is mild.  If she is in severe flare or Plaquenil fails to improve her symptoms she can restart Orencia.  Thanks.

## 2025-01-28 ENCOUNTER — TELEPHONE (OUTPATIENT)
Dept: INTERNAL MEDICINE | Facility: CLINIC | Age: 65
End: 2025-01-28
Payer: COMMERCIAL

## 2025-01-28 NOTE — TELEPHONE ENCOUNTER
Spoke with pt access center regarding pt wanting to see Dr. Templeton, informed pt access center it would be a conflict of interest due to both doctors being in the same group

## 2025-01-30 PROBLEM — N30.10 INTERSTITIAL CYSTITIS: Status: ACTIVE | Noted: 2025-01-30

## 2025-01-30 PROBLEM — M51.360 DEGENERATION OF INTERVERTEBRAL DISC OF LUMBAR REGION WITH DISCOGENIC BACK PAIN: Chronic | Status: ACTIVE | Noted: 2025-01-30

## 2025-01-30 PROBLEM — M21.949 DEFORMITY OF HAND DUE TO RHEUMATOID ARTHRITIS: Status: RESOLVED | Noted: 2024-02-19 | Resolved: 2025-01-30

## 2025-01-30 PROBLEM — M06.9 FLARE OF RHEUMATOID ARTHRITIS: Status: RESOLVED | Noted: 2022-03-30 | Resolved: 2025-01-30

## 2025-01-30 PROBLEM — Z51.81 ENCOUNTER FOR MEDICATION MONITORING: Status: RESOLVED | Noted: 2021-02-01 | Resolved: 2025-01-30

## 2025-01-30 PROBLEM — M50.30 DDD (DEGENERATIVE DISC DISEASE), CERVICAL: Chronic | Status: ACTIVE | Noted: 2025-01-30

## 2025-01-30 PROBLEM — R91.8 MULTIPLE PULMONARY NODULES: Status: RESOLVED | Noted: 2024-10-01 | Resolved: 2025-01-30

## 2025-01-30 PROBLEM — R91.8 MULTIPLE PULMONARY NODULES: Status: ACTIVE | Noted: 2025-01-30

## 2025-01-30 PROBLEM — A08.4 VIRAL GASTROENTERITIS: Status: RESOLVED | Noted: 2022-10-03 | Resolved: 2025-01-30

## 2025-01-30 PROBLEM — F41.1 GENERALIZED ANXIETY DISORDER: Chronic | Status: ACTIVE | Noted: 2025-01-30

## 2025-01-30 PROBLEM — E78.00 PURE HYPERCHOLESTEROLEMIA: Status: ACTIVE | Noted: 2025-01-30

## 2025-01-30 PROBLEM — Z79.60 LONG TERM CURRENT USE OF IMMUNOSUPPRESSIVE DRUG: Status: RESOLVED | Noted: 2019-05-14 | Resolved: 2025-01-30

## 2025-01-30 PROBLEM — D84.821 DRUG-INDUCED IMMUNODEFICIENCY: Status: RESOLVED | Noted: 2024-02-19 | Resolved: 2025-01-30

## 2025-01-30 PROBLEM — M25.551 RIGHT HIP PAIN: Status: RESOLVED | Noted: 2022-10-03 | Resolved: 2025-01-30

## 2025-01-30 PROBLEM — K44.9 HIATAL HERNIA: Status: ACTIVE | Noted: 2025-01-30

## 2025-01-30 PROBLEM — Z79.899 DRUG-INDUCED IMMUNODEFICIENCY: Status: RESOLVED | Noted: 2024-02-19 | Resolved: 2025-01-30

## 2025-01-30 PROBLEM — M05.79 RHEUMATOID ARTHRITIS INVOLVING MULTIPLE SITES WITH POSITIVE RHEUMATOID FACTOR: Chronic | Status: ACTIVE | Noted: 2019-05-14

## 2025-01-30 PROBLEM — R91.8 MULTIPLE PULMONARY NODULES: Chronic | Status: ACTIVE | Noted: 2025-01-30

## 2025-01-30 PROBLEM — M50.30 DDD (DEGENERATIVE DISC DISEASE), CERVICAL: Status: ACTIVE | Noted: 2025-01-30

## 2025-01-30 PROBLEM — E04.1 THYROID NODULE: Chronic | Status: ACTIVE | Noted: 2025-01-30

## 2025-01-30 PROBLEM — E55.9 VITAMIN D DEFICIENCY: Status: RESOLVED | Noted: 2023-06-20 | Resolved: 2025-01-30

## 2025-01-30 PROBLEM — R73.01 IMPAIRED FASTING GLUCOSE: Status: ACTIVE | Noted: 2025-01-30

## 2025-01-30 PROBLEM — I10 ESSENTIAL HYPERTENSION: Status: ACTIVE | Noted: 2025-01-30

## 2025-01-30 PROBLEM — D84.9 IMMUNOSUPPRESSED STATUS: Status: RESOLVED | Noted: 2019-05-14 | Resolved: 2025-01-30

## 2025-01-30 PROBLEM — E78.00 PURE HYPERCHOLESTEROLEMIA: Chronic | Status: ACTIVE | Noted: 2025-01-30

## 2025-01-30 PROBLEM — E55.9 VITAMIN D DEFICIENCY: Status: ACTIVE | Noted: 2025-01-30

## 2025-01-30 PROBLEM — M06.9 DEFORMITY OF HAND DUE TO RHEUMATOID ARTHRITIS: Status: RESOLVED | Noted: 2024-02-19 | Resolved: 2025-01-30

## 2025-01-30 PROBLEM — D72.10 EOSINOPHILIA: Status: RESOLVED | Noted: 2019-05-14 | Resolved: 2025-01-30

## 2025-01-30 PROBLEM — F41.1 GENERALIZED ANXIETY DISORDER: Status: ACTIVE | Noted: 2025-01-30

## 2025-01-30 PROBLEM — N30.10 INTERSTITIAL CYSTITIS: Chronic | Status: ACTIVE | Noted: 2025-01-30

## 2025-01-30 PROBLEM — I10 HTN (HYPERTENSION): Status: RESOLVED | Noted: 2025-01-14 | Resolved: 2025-01-30

## 2025-01-30 PROBLEM — K44.9 HIATAL HERNIA: Chronic | Status: ACTIVE | Noted: 2025-01-30

## 2025-01-30 PROBLEM — R10.9 ABDOMINAL PAIN: Status: RESOLVED | Noted: 2022-10-03 | Resolved: 2025-01-30

## 2025-01-30 PROBLEM — M54.6 PAIN IN THORACIC SPINE: Status: RESOLVED | Noted: 2024-01-09 | Resolved: 2025-01-30

## 2025-01-30 PROBLEM — M54.9 DORSALGIA, UNSPECIFIED: Status: RESOLVED | Noted: 2024-01-09 | Resolved: 2025-01-30

## 2025-01-30 PROBLEM — I10 ESSENTIAL HYPERTENSION: Chronic | Status: ACTIVE | Noted: 2025-01-30

## 2025-01-30 PROBLEM — Z79.899 LONG TERM CURRENT USE OF IMMUNOSUPPRESSIVE DRUG: Status: RESOLVED | Noted: 2019-05-14 | Resolved: 2025-01-30

## 2025-01-30 PROBLEM — M51.360 DEGENERATION OF INTERVERTEBRAL DISC OF LUMBAR REGION WITH DISCOGENIC BACK PAIN: Status: ACTIVE | Noted: 2025-01-30

## 2025-01-30 PROBLEM — R73.01 IMPAIRED FASTING GLUCOSE: Chronic | Status: ACTIVE | Noted: 2025-01-30

## 2025-01-30 PROBLEM — E55.9 VITAMIN D DEFICIENCY: Chronic | Status: ACTIVE | Noted: 2025-01-30

## 2025-01-30 PROBLEM — E04.1 THYROID NODULE: Status: ACTIVE | Noted: 2025-01-30

## 2025-02-11 ENCOUNTER — PATIENT MESSAGE (OUTPATIENT)
Dept: RHEUMATOLOGY | Facility: CLINIC | Age: 65
End: 2025-02-11
Payer: COMMERCIAL

## 2025-02-16 ENCOUNTER — PATIENT MESSAGE (OUTPATIENT)
Dept: ADMINISTRATIVE | Facility: HOSPITAL | Age: 65
End: 2025-02-16
Payer: MEDICARE

## 2025-02-20 ENCOUNTER — RESULTS FOLLOW-UP (OUTPATIENT)
Dept: RHEUMATOLOGY | Facility: CLINIC | Age: 65
End: 2025-02-20
Payer: COMMERCIAL

## 2025-02-24 ENCOUNTER — TELEPHONE (OUTPATIENT)
Dept: RHEUMATOLOGY | Facility: CLINIC | Age: 65
End: 2025-02-24
Payer: MEDICARE

## 2025-02-24 ENCOUNTER — OFFICE VISIT (OUTPATIENT)
Dept: RHEUMATOLOGY | Facility: CLINIC | Age: 65
End: 2025-02-24
Payer: MEDICARE

## 2025-02-24 DIAGNOSIS — Z79.899 DRUG-INDUCED IMMUNODEFICIENCY: ICD-10-CM

## 2025-02-24 DIAGNOSIS — M05.79 RHEUMATOID ARTHRITIS INVOLVING MULTIPLE SITES WITH POSITIVE RHEUMATOID FACTOR: Primary | ICD-10-CM

## 2025-02-24 DIAGNOSIS — D84.821 DRUG-INDUCED IMMUNODEFICIENCY: ICD-10-CM

## 2025-02-24 DIAGNOSIS — Z51.81 ENCOUNTER FOR MEDICATION MONITORING: ICD-10-CM

## 2025-02-24 DIAGNOSIS — M06.9 DEFORMITY OF HAND DUE TO RHEUMATOID ARTHRITIS: ICD-10-CM

## 2025-02-24 DIAGNOSIS — M21.949 DEFORMITY OF HAND DUE TO RHEUMATOID ARTHRITIS: ICD-10-CM

## 2025-02-24 PROCEDURE — 98006 SYNCH AUDIO-VIDEO EST MOD 30: CPT | Mod: 95,,, | Performed by: INTERNAL MEDICINE

## 2025-02-24 PROCEDURE — G2211 COMPLEX E/M VISIT ADD ON: HCPCS | Mod: 95,,, | Performed by: INTERNAL MEDICINE

## 2025-02-24 NOTE — PROGRESS NOTES
RHEUMATOLOGY FOLLOW UP - TELE VISIT     The patient location is: [place]  The chief complaint leading to consultation is:  Rheumatoid arthritis  Visit type: Virtual visit with synchronous audio and video  Total time spent with patient:  10 minutes  Each patient to whom he or she provides medical services by telemedicine is:  (1) informed of the relationship between the physician and patient and the respective role of any other health care provider with respect to management of the patient; and (2) notified that he or she may decline to receive medical services by telemedicine and may withdraw from such care at any time.    Chief complaints, HPI, ROS, EXAM, Assessment & Plans:-  Katarzyna A Joshua a 64 y.o. pleasant female seen today for follow-up for rheumatoid arthritis- seropositive nonerosive rheumatoid arthritis.  After severe flare shows started on Orencia last year.  She was doing well until she had recurrent upper respiratory tract infections for which Orencia was held.  Recovered well.  Denies any significant flare of rheumatoid since holding Orencia.  Rheumatological review of system negative otherwise.  Exam shows 100% fist formation bilateral hands with stable deformities .   1. Rheumatoid arthritis involving multiple sites with positive rheumatoid factor    2. Drug-induced immunodeficiency    3. Encounter for medication monitoring    4. 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               Latest Reference Range & Units 05/14/19 15:02   JOSELIN Screen Negative <1:160  Negative <1:160   CCP Antibodies <5.0 U/mL 11.7 (H)   Rheumatoid Factor 0.0 - 15.0 IU/mL 74.0 (H)   (H): Data is abnormally  high     Latest Reference Range & Units 02/20/25 11:57   WBC 3.8 - 10.8 Thousand/uL 6.8   RBC 3.80 - 5.10 Million/uL 4.59   Hemoglobin 11.7 - 15.5 g/dL 12.5   Hematocrit 35.0 - 45.0 % 38.5   MCV 80.0 - 100.0 fL 83.9   MCH 27.0 - 33.0 pg 27.2   MCHC 32.0 - 36.0 g/dL 32.5   RDW 11.0 - 15.0 % 13.8   Platelet Count 140 - 400 Thousand/uL 272   MPV 7.5 - 12.5 fL 10.6   Neutrophils Relative % 37.2   Lymph % % 52.8   Mono % % 8.1   Eos % % 1.3   Basophil % % 0.6   Neutrophils, Abs 1,500 - 7,800 cells/uL 2,530   Lymph # 850 - 3,900 cells/uL 3,590   Mono # 200 - 950 cells/uL 551   Eos # 15 - 500 cells/uL 88   Baso # 0 - 200 cells/uL 41   Sed Rate < OR = 30 mm/h 2   Sodium 135 - 146 mmol/L 141   Potassium 3.5 - 5.3 mmol/L 3.9   Chloride 98 - 110 mmol/L 106   CO2 20 - 32 mmol/L 26   BUN 7 - 25 mg/dL 16   Creatinine 0.50 - 1.05 mg/dL 0.55   BUN/CREAT RATIO 6 - 22 (calc) SEE NOTE:   eGFR > OR = 60 mL/min/1.73m2 104   Glucose 65 - 99 mg/dL 91   Calcium 8.6 - 10.4 mg/dL 9.3   ALP 37 - 153 U/L 106   PROTEIN TOTAL 6.1 - 8.1 g/dL 6.5   Albumin 3.6 - 5.1 g/dL 4.6   Albumin/Globulin Ratio 1.0 - 2.5 (calc) 2.4   BILIRUBIN TOTAL 0.2 - 1.2 mg/dL 0.6   AST 10 - 35 U/L 12   ALT 6 - 29 U/L 10   CRP <8.0 mg/L 4.3   Globulin, Total 1.9 - 3.7 g/dL (calc) 1.9         Mild seropositive nonerosive rheumatoid arthritis . Try plaquenil Once daily for 2 weeks and increase further to twice daily.  If Plaquenil fails to caused significant improvement of her mild arthritis, restart Orencia. Failed methotrexate, Enbrel, Humira and Actemra in the past.  [facility] labs reviewed - STABLE CBC, CMP, ESR AND CRP.   Hand radiographs shows no new erosions.  Safety labs a before next visit   # Follow up in about 6 months (around 8/24/2025).      Past Medical History:   Diagnosis Date    Acid reflux     Anxiety     Arthritis     Atrial fibrillation     Clotting disorder     Emphysema of lung     Lung mass     Thyroid disease        Past Surgical History:   Procedure  Laterality Date     SECTION N/A 1982    Vertical skin incision, unknown uterine scar     SECTION N/A 1985    Vertical skin incision, unknown uterine scar     SECTION N/A 1988    Vertical skin incision, unknown uterine scar        Social History     Tobacco Use    Smoking status: Never     Passive exposure: Never    Smokeless tobacco: Never   Substance Use Topics    Alcohol use: Yes     Alcohol/week: 1.0 standard drink of alcohol     Types: 1 Glasses of wine per week     Comment: usually with dinner    Drug use: Never       Family History   Problem Relation Name Age of Onset    Thyroid disease Mother Erna     Arthritis Mother Erna     Heart disease Mother Erna     Alzheimer's disease Father Crum     Heart disease Father Crum     Heart disease Sister Heidi        Review of patient's allergies indicates:   Allergen Reactions    Sulfa (sulfonamide antibiotics)      Other reaction(s): Interaction with others - finding  interacts with methotrexate         Medication List with Changes/Refills   Current Medications    ASPIRIN (ECOTRIN) 81 MG EC TABLET    Take 81 mg by mouth.    BUSPIRONE (BUSPAR) 5 MG TAB    Take 1 tablet (5 mg total) by mouth 3 (three) times daily as needed (anxiety).    DOXAZOSIN (CARDURA) 2 MG TABLET    Take 2 mg by mouth once daily.    EPINEPHRINE (EPIPEN) 0.3 MG/0.3 ML ATIN    Inject 0.3 mg as directed daily as needed.    FAMOTIDINE (PEPCID) 40 MG TABLET    Take 40 mg by mouth 2 (two) times daily as needed.    FEXOFENADINE (ALLEGRA) 60 MG TABLET    Take 60 mg by mouth once daily.    HYDROXYCHLOROQUINE (PLAQUENIL) 200 MG TABLET    Take 1 tablet (200 mg total) by mouth 2 (two) times daily.       Disclaimer: This note was prepared using voice recognition system and is likely to have sound alike errors and is not proof read.  Please call me with any questions.    Answers submitted by the patient for this visit:  Rheumatology Questionnaire (Submitted on  2/24/2025)  fever: No  eye redness: No  mouth sores: No  headaches: No  shortness of breath: No  chest pain: No  trouble swallowing: No  diarrhea: No  constipation: No  unexpected weight change: No  genital sore: No  dysuria: No  During the last 3 days, have you had a skin rash?: No  Bruises or bleeds easily: Yes  cough: No

## 2025-03-03 PROBLEM — R00.2 PALPITATIONS: Status: ACTIVE | Noted: 2025-03-03

## 2025-04-11 DIAGNOSIS — F41.1 GENERALIZED ANXIETY DISORDER: Primary | ICD-10-CM

## 2025-04-11 RX ORDER — BUSPIRONE HYDROCHLORIDE 5 MG/1
5 TABLET ORAL
Qty: 180 TABLET | Refills: 1 | Status: SHIPPED | OUTPATIENT
Start: 2025-04-11

## 2025-04-28 ENCOUNTER — TELEPHONE (OUTPATIENT)
Dept: RHEUMATOLOGY | Facility: CLINIC | Age: 65
End: 2025-04-28
Payer: MEDICARE

## 2025-04-28 NOTE — TELEPHONE ENCOUNTER
----- Message from Baron Mabry MD sent at 4/28/2025 10:56 AM CDT -----  Contact: Katarzyna    ----- Message -----  From: Bahman Norman MA  Sent: 4/28/2025  10:23 AM CDT  To: Baron Mabry MD    Pt needs assistance  ----- Message -----  From: Vesta Ozuna  Sent: 4/28/2025   9:34 AM CDT  To: Clotilde Draper Staff    .Type:  Patient Request Call BackWho Called: Dorinda the patient know what this is regarding?: She is still breaking out with Hives Would the patient rather a call back or a response via [facility]? Call back Best Call Back Number: Please call her at [phone] Information:  She stated that her allergy doctor wants her to try Xolair and she has some concerns that she wants to speak with the office about

## 2025-04-29 NOTE — TELEPHONE ENCOUNTER
Outgoing call to patient per her request.   Discussed w/ patient her concerns for Xolair and potential interactions with HCQ and/or biologic treatment for RA.   Provided direct line for pt to update w/ results of 2nd allergy test upcoming. Patient voiced understanding and had no further questions at this time and was very grateful for the call.

## 2025-05-15 ENCOUNTER — TELEPHONE (OUTPATIENT)
Dept: RHEUMATOLOGY | Facility: CLINIC | Age: 65
End: 2025-05-15
Payer: MEDICARE

## 2025-05-15 NOTE — TELEPHONE ENCOUNTER
----- Message from Uyen sent at 5/15/2025  2:31 PM CDT -----  Contact: Katarzyna  .Patient is calling to speak with the nurse regarding sooner appt  . Reports has been having hives and needing to come in and see provider . Please give patient a call back at .[phone]

## 2025-05-15 NOTE — TELEPHONE ENCOUNTER
Patient is scheduled for 5/27.  She has been dealing with hives since January and going back and forth between dermatology and allergist.  Biopsy was done on the hives on 5/7 (she will bring pathology report once completed to appt)  Both allergist and dermatologist recommended for her to see Dr SHARMA to discuss

## 2025-05-27 ENCOUNTER — LAB VISIT (OUTPATIENT)
Dept: LAB | Facility: HOSPITAL | Age: 65
End: 2025-05-27
Attending: INTERNAL MEDICINE
Payer: MEDICARE

## 2025-05-27 ENCOUNTER — TELEPHONE (OUTPATIENT)
Dept: RHEUMATOLOGY | Facility: CLINIC | Age: 65
End: 2025-05-27

## 2025-05-27 ENCOUNTER — TELEPHONE (OUTPATIENT)
Dept: RHEUMATOLOGY | Facility: CLINIC | Age: 65
End: 2025-05-27
Payer: MEDICARE

## 2025-05-27 ENCOUNTER — OFFICE VISIT (OUTPATIENT)
Dept: RHEUMATOLOGY | Facility: CLINIC | Age: 65
End: 2025-05-27
Payer: MEDICARE

## 2025-05-27 VITALS
DIASTOLIC BLOOD PRESSURE: 88 MMHG | HEART RATE: 74 BPM | WEIGHT: 127 LBS | SYSTOLIC BLOOD PRESSURE: 130 MMHG | BODY MASS INDEX: 23.37 KG/M2 | HEIGHT: 62 IN

## 2025-05-27 DIAGNOSIS — D84.821 DRUG-INDUCED IMMUNODEFICIENCY: ICD-10-CM

## 2025-05-27 DIAGNOSIS — M06.9 DEFORMITY OF HAND DUE TO RHEUMATOID ARTHRITIS: ICD-10-CM

## 2025-05-27 DIAGNOSIS — M05.79 RHEUMATOID ARTHRITIS INVOLVING MULTIPLE SITES WITH POSITIVE RHEUMATOID FACTOR: Primary | ICD-10-CM

## 2025-05-27 DIAGNOSIS — M21.949 DEFORMITY OF HAND DUE TO RHEUMATOID ARTHRITIS: ICD-10-CM

## 2025-05-27 DIAGNOSIS — Z79.899 DRUG-INDUCED IMMUNODEFICIENCY: ICD-10-CM

## 2025-05-27 DIAGNOSIS — Z79.899 LONG-TERM USE OF PLAQUENIL: ICD-10-CM

## 2025-05-27 DIAGNOSIS — Z51.81 ENCOUNTER FOR MEDICATION MONITORING: ICD-10-CM

## 2025-05-27 DIAGNOSIS — M05.79 RHEUMATOID ARTHRITIS INVOLVING MULTIPLE SITES WITH POSITIVE RHEUMATOID FACTOR: ICD-10-CM

## 2025-05-27 DIAGNOSIS — L95.8 URTICARIAL VASCULITIS: ICD-10-CM

## 2025-05-27 LAB
C3 SERPL-MCNC: 111 MG/DL (ref 50–180)
C4 COMPLEMENT (OHS): 31 MG/DL (ref 11–44)

## 2025-05-27 PROCEDURE — 82595 ASSAY OF CRYOGLOBULIN: CPT

## 2025-05-27 PROCEDURE — G2211 COMPLEX E/M VISIT ADD ON: HCPCS | Mod: S$PBB,,, | Performed by: INTERNAL MEDICINE

## 2025-05-27 PROCEDURE — 83516 IMMUNOASSAY NONANTIBODY: CPT | Mod: 59

## 2025-05-27 PROCEDURE — 86036 ANCA SCREEN EACH ANTIBODY: CPT

## 2025-05-27 PROCEDURE — 36415 COLL VENOUS BLD VENIPUNCTURE: CPT

## 2025-05-27 PROCEDURE — 86160 COMPLEMENT ANTIGEN: CPT | Mod: 59

## 2025-05-27 PROCEDURE — 99999 PR PBB SHADOW E&M-EST. PATIENT-LVL III: CPT | Mod: PBBFAC,,, | Performed by: INTERNAL MEDICINE

## 2025-05-27 PROCEDURE — 99213 OFFICE O/P EST LOW 20 MIN: CPT | Mod: PBBFAC | Performed by: INTERNAL MEDICINE

## 2025-05-27 PROCEDURE — 86235 NUCLEAR ANTIGEN ANTIBODY: CPT | Mod: 59

## 2025-05-27 PROCEDURE — 86038 ANTINUCLEAR ANTIBODIES: CPT

## 2025-05-27 PROCEDURE — 99215 OFFICE O/P EST HI 40 MIN: CPT | Mod: S$PBB,,, | Performed by: INTERNAL MEDICINE

## 2025-05-27 PROCEDURE — 83516 IMMUNOASSAY NONANTIBODY: CPT

## 2025-05-27 PROCEDURE — 86160 COMPLEMENT ANTIGEN: CPT

## 2025-05-27 RX ORDER — HYDROXYCHLOROQUINE SULFATE 200 MG/1
200 TABLET, FILM COATED ORAL 2 TIMES DAILY
Qty: 60 TABLET | Refills: 11 | Status: SHIPPED | OUTPATIENT
Start: 2025-05-27

## 2025-05-27 RX ORDER — COLCHICINE 0.6 MG/1
0.6 TABLET ORAL DAILY
Qty: 30 TABLET | Refills: 11 | Status: SHIPPED | OUTPATIENT
Start: 2025-05-27 | End: 2026-05-27

## 2025-05-27 NOTE — TELEPHONE ENCOUNTER
Copied from CRM #[id number]. Topic: Medications - Medication Question  >> May 27, 2025 10:10 AM Lasha wrote:  .Type:  Medication Concerns     Name of Caller: Katarzyna   Pharmacy Name:   Prescription Name: colchicine (COLCRYS) 0.6 mg tablet  Reason: (Major) Drug interaction with hydroxychloroquine (PLAQUENIL) 200 mg tablet and carvediloL (COREG) 6.25 MG tablet  Best Call Back Number: [phone]  Additional Information:  Pt would like to get a call back to discuss other options of medications

## 2025-05-27 NOTE — TELEPHONE ENCOUNTER
Patient read that Colchicine may possibly have major interaction with Coreg and Plaquenil.  She is apprehensive about taking it.      Tried to advise her that many patients take both medications without issues bit she requested that I send you a message    Please advise

## 2025-05-27 NOTE — PROGRESS NOTES
RHEUMATOLOGY CLINIC FOLLOW UP VISIT  Chief complaints, HPI, ROS, EXAM, Assessment & Plans:-  Katarzyna Lewis a 64 y.o. pleasant female comes in for follow-up visit.  Seropositive erosive rheumatoid arthritis on Plaquenil here for follow-up.  Denies any rheumatoid flare but complains of worsening urticaria for several months.  On multiple allergy medications.  Workup showed multiple allergies but medications have not provided any benefit.  Allergist trying to consider starting Xolair if her disease remains progressive.  No significant morning stiffness of small or large joints.  No problems with Plaquenil.  Recent ophthalmology visit showed no evidence of Plaquenil toxicity.  Rheumatological review of system negative otherwise.  Physical examination shows chronic deformities but no active synovitis..    1. Rheumatoid arthritis involving multiple sites with positive rheumatoid factor    2. 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 Urticarial vasculitis    4. 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    Long-term use of Plaquenil    Urticarial vasculitis    Relevant Medications    colchicine (COLCRYS) 0.6 mg tablet    Other Relevant Orders    C3 Complement    C4 Complement    Cryoglobulin    Sjogrens syndrome-A extractable nuclear antibody    Sjogrens syndrome-B extractable nuclear antibody    Anti-Neutrophilic Cytoplasmic Antibody    Proteinase 3 Autoantibodies    Myeloperoxidase Antibody (MPO)    G6PD,Quantitative    ANTI-HISTONE ANTIBODY       Labs reviewed today:-   Latest Reference Range & Units 05/27/25 09:54   C3 Complement 50 - 180 mg/dL 111   C4  Complement 11 - 44 mg/dL 31      [facility] Reference Range & Units 05/13/25 23:00   WBC 3.4 - 10.8 x10E3/uL 12.1 (H)   RBC 3.77 - 5.28 x10E6/uL 4.70   Hemoglobin 11.1 - 15.9 g/dL 12.1   Hematocrit 34.0 - 46.6 % 40.0   MCV 79 - 97 fL 85   MCH 26.6 - 33.0 pg 25.7 (L)   MCHC 31.5 - 35.7 g/dL 30.3 (L)   RDW 11.7 - 15.4 % 14.4   Platelet Count 150 - 450 x10E3/uL 312   Neutrophils Not Estab. % 57   Lymph % Not Estab. % 36   Mono % Not Estab. % 6   Eos % Not Estab. % 0   Basophil % Not Estab. % 0   Immature Granulocytes Not Estab. % 1   Neutrophils, Abs 1.4 - 7.0 x10E3/uL 7.0   Lymph # 0.7 - 3.1 x10E3/uL 4.3 (H)   Mono # 0.1 - 0.9 x10E3/uL 0.7   Eos # 0.0 - 0.4 x10E3/uL 0.0   Baso # 0.0 - 0.2 x10E3/uL 0.0   Sed Rate 0 - 40 mm/hr 6   Sodium 134 - 144 mmol/L 140   Potassium 3.5 - 5.2 mmol/L 4.4   Chloride 96 - 106 mmol/L 105   CO2 20 - 29 mmol/L 23   BUN 8 - 27 mg/dL 17   Creatinine 0.57 - 1.00 mg/dL 0.67   BUN/CREAT RATIO 12 - 28  25   eGFR >59 mL/min/1.73 99   Glucose 70 - 99 mg/dL 87   Calcium 8.7 - 10.3 mg/dL 9.3   ALP 44 - 121 IU/L 99   PROTEIN TOTAL 6.0 - 8.5 g/dL 6.4   Albumin 3.9 - 4.9 g/dL 4.5   BILIRUBIN TOTAL 0.0 - 1.2 mg/dL 0.5   AST 0 - 40 IU/L 13   ALT 0 - 32 IU/L 11   CRP 0 - 10 mg/L 6   Globulin, Total 1.5 - 4.5 g/dL 1.9   Cholesterol Total 100 - 199 mg/dL 153   HDL >39 mg/dL 51   LDL Calculated 0 - 99 mg/dL 83   Triglycerides 0 - 149 mg/dL 107   VLDL Cholesterol Catalino 5 - 40 mg/dL 19   Vitamin D 30.0 - 100.0 ng/mL 25.0 (L)   Hemoglobin A1C External 4.8 - 5.6 % 6.0 (H)   TSH 0.450 - 4.500 uIU/mL 1.030   Color, UA Yellow  Yellow   Clarity, UA Clear  Cloudy !   Spec Grav UA 1.005 - 1.030  1.024   pH, UA 5.0 - 7.5  7.5   Protein, UA Negative/Trace  Trace   Glucose, UA Negative  Negative   Ketones, UA Negative  Negative   Blood, UA Negative  Negative   NITRITE UA Negative  Negative   UROBILINOGEN UA 0.2 - 1.0 mg/dL 0.2   Bilirubin, UA Negative  Negative   Leukocyte Esterase, UA Negative  1+ !   RBC, UA 0 - 2 /hpf None  seen   WBC, UA 0 - 5 /hpf 11-30 !   Bacteria, UA None seen/Few  Moderate !   Renal Epithel, UA None seen /hpf >10 !   Crystal Type N/A  None seen   Antimicrobial Susceptibility  Comment   Epithelial Cells (non renal) 0 - 10 /hpf 0-10   Microscopic Examination  See below:   Result 1  Klebsiella pneumoniae !   Specimen Status Report  Comment   Urinalysis Reflex  Comment   (H): Data is abnormally high  (L): Data is abnormally low  !: Data is abnormal        Well controlled seropositive erosive deforming rheumatoid arthritis on Plaquenil without any active disease.  No indication to restart biologic medications-Orencia at this time.  Severe worsening urticaria with clinical exam raising possibility of urticarial vasculitis with hypertrophic scars and symptoms of severe burning pain.  Check complement levels and other serologies.  Advised to try colchicine for urticarial vasculitis.  Also advised to consider starting Xolair as soon as possible for possible idiopathic urticaria with vasculitis.  Yearly ophthalmology screening for Plaquenil toxicity advised.  Safety labs normal other than mild leukocytosis probably from recent steroid use for urticaria.  I have explained all of the above in detail and the patient understands all of the current recommendation(s). I have answered all questions to the best of my ability and to their complete satisfaction.       # Follow up in about 6 months (around 11/27/2025).      Disclaimer: This note was prepared using voice recognition system and is likely to have sound alike errors and is not proof read.  Please call me with any questions.      Total time spent 45 minutes. This includes face to face time and non-face to face time preparing to see the patient (eg, review of tests), obtaining and/or reviewing separately obtained history, documenting clinical information in the electronic or other health record, independently interpreting results and communicating results to the  patient/family/caregiver, or care coordinator.

## 2025-05-28 LAB
ANA (OHS): NORMAL
SSA  ANTIBODY (OHS): 0.06 RATIO (ref 0–0.99)
SSA INTERPRETATION (OHS): NEGATIVE
SSB  ANTIBODY (OHS): 0.06 RATIO
SSB INTERPRETATION (OHS): NEGATIVE

## 2025-05-29 ENCOUNTER — TELEPHONE (OUTPATIENT)
Dept: RHEUMATOLOGY | Facility: CLINIC | Age: 65
End: 2025-05-29
Payer: MEDICARE

## 2025-05-29 LAB
G6PD RBC-CCNT: 11.9 U/G HB (ref 8–11.9)
PROTEINASE3 IGG SERPL IA-ACNC: <0.2 U

## 2025-05-29 NOTE — TELEPHONE ENCOUNTER
Copied from CRM #[id number]. Topic: General Inquiry - Patient Advice  >> May 29, 2025 11:14 AM Tiffany wrote:  Type:  Needs Medical Advice    Who Called: Nurse Donovan/Dr.Erin Payton office  They ask if is possible  call  please call her at her cell phone number: [phone].   If one of the nurses are able to speak with Nurse Donovan please call her at   They have some medical questions about Mrs Colón.  Would the patient rather a call back or a response via [facility]? Call back  Thanks!

## 2025-05-29 NOTE — TELEPHONE ENCOUNTER
Outgoing call per pt's request. Discussed DDI concerns w/ carvedilol, colchicine, HCQ. Pt amenable to proceed w. Colchicine, voiced understanding, and had no further concerns at this time.

## 2025-05-30 LAB
HISTONE IGG SER IA-ACNC: 0.3 UNITS
W C-ANCA: NORMAL TITER
W MYELOPEROXIDASE (MPO) AB: <0.2 U/ML
W P-ANCA: NORMAL TITER

## 2025-06-06 LAB — W CRYOGLOBULIN: NORMAL

## 2025-07-10 ENCOUNTER — TELEPHONE (OUTPATIENT)
Dept: RHEUMATOLOGY | Facility: CLINIC | Age: 65
End: 2025-07-10
Payer: MEDICARE

## 2025-07-10 NOTE — TELEPHONE ENCOUNTER
Incoming call from pt, recent interval diagnosis of likely celiac disease. Pending results of two final tests.  She was advised by naturopath that her histamine levels were high and recommended product called Dipesh Bio Restore liposomal. Requested info.     Patient reports she does not take a MVI yet.     Questions regarding Quercedin.     Histamine blocker [employer] Histamine [employer]- LATONYA enzyme that blocks histamine levels. Advised this is porcine kidney derived.

## 2025-09-03 ENCOUNTER — TELEPHONE (OUTPATIENT)
Dept: RHEUMATOLOGY | Facility: CLINIC | Age: 65
End: 2025-09-03
Payer: MEDICARE